# Patient Record
Sex: FEMALE | Race: WHITE | NOT HISPANIC OR LATINO | Employment: FULL TIME | URBAN - NONMETROPOLITAN AREA
[De-identification: names, ages, dates, MRNs, and addresses within clinical notes are randomized per-mention and may not be internally consistent; named-entity substitution may affect disease eponyms.]

---

## 2021-12-30 LAB
EXTERNAL ABO GROUPING: NORMAL
EXTERNAL ANTIBODY SCREEN: NEGATIVE
EXTERNAL HEPATITIS B SURFACE ANTIGEN: NEGATIVE
EXTERNAL RH FACTOR: POSITIVE
EXTERNAL RUBELLA QUALITATIVE: NORMAL
EXTERNAL SYPHILIS RPR SCREEN: NORMAL
HIV1 P24 AG SERPL QL IA: NORMAL

## 2022-03-22 ENCOUNTER — TRANSCRIBE ORDERS (OUTPATIENT)
Dept: ADMINISTRATIVE | Facility: HOSPITAL | Age: 32
End: 2022-03-22

## 2022-03-22 DIAGNOSIS — O12.02 GESTATIONAL EDEMA IN SECOND TRIMESTER: Primary | ICD-10-CM

## 2022-03-28 ENCOUNTER — APPOINTMENT (OUTPATIENT)
Dept: ULTRASOUND IMAGING | Facility: HOSPITAL | Age: 32
End: 2022-03-28

## 2022-03-28 ENCOUNTER — HOSPITAL ENCOUNTER (OUTPATIENT)
Dept: CARDIOLOGY | Facility: HOSPITAL | Age: 32
Discharge: HOME | End: 2022-03-28
Admitting: OBSTETRICS & GYNECOLOGY

## 2022-03-28 DIAGNOSIS — O12.02 GESTATIONAL EDEMA IN SECOND TRIMESTER: ICD-10-CM

## 2022-03-28 PROCEDURE — 93970 EXTREMITY STUDY: CPT | Performed by: RADIOLOGY

## 2022-03-28 PROCEDURE — 93970 EXTREMITY STUDY: CPT

## 2022-06-24 LAB — EXTERNAL GROUP B STREP ANTIGEN: NEGATIVE

## 2022-07-15 ENCOUNTER — ANESTHESIA (OUTPATIENT)
Dept: LABOR AND DELIVERY | Facility: HOSPITAL | Age: 32
End: 2022-07-15

## 2022-07-15 ENCOUNTER — ANESTHESIA EVENT (OUTPATIENT)
Dept: LABOR AND DELIVERY | Facility: HOSPITAL | Age: 32
End: 2022-07-15

## 2022-07-15 ENCOUNTER — HOSPITAL ENCOUNTER (INPATIENT)
Facility: HOSPITAL | Age: 32
LOS: 2 days | Discharge: HOME | End: 2022-07-17
Attending: OBSTETRICS & GYNECOLOGY | Admitting: OBSTETRICS & GYNECOLOGY

## 2022-07-15 ENCOUNTER — HOSPITAL ENCOUNTER (INPATIENT)
Dept: LABOR AND DELIVERY | Facility: HOSPITAL | Age: 32
Discharge: HOME | End: 2022-07-15

## 2022-07-15 PROBLEM — Z34.90 PREGNANCY: Status: ACTIVE | Noted: 2022-07-15

## 2022-07-15 LAB
ABO GROUP BLD: NORMAL
ABO GROUP BLD: NORMAL
AMPHET+METHAMPHET UR QL: NEGATIVE
AMPHETAMINES UR QL: NEGATIVE
ATMOSPHERIC PRESS: 730 MMHG
BARBITURATES UR QL SCN: NEGATIVE
BASE EXCESS BLDCOV CALC-SCNC: -8.3 MMOL/L (ref 0–2)
BASOPHILS # BLD AUTO: 0.02 10*3/MM3 (ref 0–0.2)
BASOPHILS NFR BLD AUTO: 0.2 % (ref 0–1.5)
BDY SITE: ABNORMAL
BENZODIAZ UR QL SCN: NEGATIVE
BLD GP AB SCN SERPL QL: NEGATIVE
BODY TEMPERATURE: 37 C
BUPRENORPHINE SERPL-MCNC: NEGATIVE NG/ML
CANNABINOIDS SERPL QL: NEGATIVE
CO2 BLDA-SCNC: 21.2 MMOL/L (ref 22–33)
COCAINE UR QL: NEGATIVE
COLLECT TME SMN: ABNORMAL
DEPRECATED RDW RBC AUTO: 47.9 FL (ref 37–54)
EOSINOPHIL # BLD AUTO: 0.08 10*3/MM3 (ref 0–0.4)
EOSINOPHIL NFR BLD AUTO: 0.7 % (ref 0.3–6.2)
ERYTHROCYTE [DISTWIDTH] IN BLOOD BY AUTOMATED COUNT: 14.2 % (ref 12.3–15.4)
HCO3 BLDCOV-SCNC: 19.7 MMOL/L (ref 18.6–21.4)
HCT VFR BLD AUTO: 31.2 % (ref 34–46.6)
HGB BLD-MCNC: 10.3 G/DL (ref 12–15.9)
HGB BLDA-MCNC: 16.4 G/DL (ref 13.5–17.5)
IMM GRANULOCYTES # BLD AUTO: 0.23 10*3/MM3 (ref 0–0.05)
IMM GRANULOCYTES NFR BLD AUTO: 1.9 % (ref 0–0.5)
INHALED O2 CONCENTRATION: 21 %
LYMPHOCYTES # BLD AUTO: 2.59 10*3/MM3 (ref 0.7–3.1)
LYMPHOCYTES NFR BLD AUTO: 21.2 % (ref 19.6–45.3)
Lab: ABNORMAL
MCH RBC QN AUTO: 31.1 PG (ref 26.6–33)
MCHC RBC AUTO-ENTMCNC: 33 G/DL (ref 31.5–35.7)
MCV RBC AUTO: 94.3 FL (ref 79–97)
METHADONE UR QL SCN: NEGATIVE
MODALITY: ABNORMAL
MONOCYTES # BLD AUTO: 0.89 10*3/MM3 (ref 0.1–0.9)
MONOCYTES NFR BLD AUTO: 7.3 % (ref 5–12)
NEUTROPHILS NFR BLD AUTO: 68.7 % (ref 42.7–76)
NEUTROPHILS NFR BLD AUTO: 8.43 10*3/MM3 (ref 1.7–7)
NOTE: ABNORMAL
NRBC BLD AUTO-RTO: 0 /100 WBC (ref 0–0.2)
OPIATES UR QL: NEGATIVE
OXYCODONE UR QL SCN: NEGATIVE
PCO2 BLDCOV: 48.6 MM HG (ref 30–60)
PCP UR QL SCN: NEGATIVE
PH BLDCOV: 7.22 PH UNITS (ref 7.19–7.46)
PLATELET # BLD AUTO: 252 10*3/MM3 (ref 140–450)
PMV BLD AUTO: 10.6 FL (ref 6–12)
PO2 BLDCOV: 20.9 MM HG (ref 16–43)
PROPOXYPH UR QL: NEGATIVE
RBC # BLD AUTO: 3.31 10*6/MM3 (ref 3.77–5.28)
RH BLD: POSITIVE
RH BLD: POSITIVE
SAO2 % BLDCOA: ABNORMAL %
SAO2 % BLDCOV: 39 % (ref 45–75)
SARS-COV-2 RNA PNL SPEC NAA+PROBE: NOT DETECTED
T&S EXPIRATION DATE: NORMAL
TRICYCLICS UR QL SCN: NEGATIVE
WBC NRBC COR # BLD: 12.24 10*3/MM3 (ref 3.4–10.8)

## 2022-07-15 PROCEDURE — 0KQM0ZZ REPAIR PERINEUM MUSCLE, OPEN APPROACH: ICD-10-PCS | Performed by: OBSTETRICS & GYNECOLOGY

## 2022-07-15 PROCEDURE — 82805 BLOOD GASES W/O2 SATURATION: CPT

## 2022-07-15 PROCEDURE — 3E033VJ INTRODUCTION OF OTHER HORMONE INTO PERIPHERAL VEIN, PERCUTANEOUS APPROACH: ICD-10-PCS | Performed by: OBSTETRICS & GYNECOLOGY

## 2022-07-15 PROCEDURE — 25010000002 FENTANYL CITRATE (PF) 50 MCG/ML SOLUTION: Performed by: ANESTHESIOLOGY

## 2022-07-15 PROCEDURE — 86850 RBC ANTIBODY SCREEN: CPT | Performed by: OBSTETRICS & GYNECOLOGY

## 2022-07-15 PROCEDURE — 25010000002 ROPIVACAINE PER 1 MG: Performed by: ANESTHESIOLOGY

## 2022-07-15 PROCEDURE — 86900 BLOOD TYPING SEROLOGIC ABO: CPT

## 2022-07-15 PROCEDURE — C1755 CATHETER, INTRASPINAL: HCPCS

## 2022-07-15 PROCEDURE — 86901 BLOOD TYPING SEROLOGIC RH(D): CPT

## 2022-07-15 PROCEDURE — 59025 FETAL NON-STRESS TEST: CPT

## 2022-07-15 PROCEDURE — 80306 DRUG TEST PRSMV INSTRMNT: CPT | Performed by: OBSTETRICS & GYNECOLOGY

## 2022-07-15 PROCEDURE — 87635 SARS-COV-2 COVID-19 AMP PRB: CPT | Performed by: OBSTETRICS & GYNECOLOGY

## 2022-07-15 PROCEDURE — 85025 COMPLETE CBC W/AUTO DIFF WBC: CPT | Performed by: OBSTETRICS & GYNECOLOGY

## 2022-07-15 PROCEDURE — 86901 BLOOD TYPING SEROLOGIC RH(D): CPT | Performed by: OBSTETRICS & GYNECOLOGY

## 2022-07-15 PROCEDURE — 86900 BLOOD TYPING SEROLOGIC ABO: CPT | Performed by: OBSTETRICS & GYNECOLOGY

## 2022-07-15 RX ORDER — IBUPROFEN 800 MG/1
800 TABLET ORAL EVERY 8 HOURS SCHEDULED
Status: DISCONTINUED | OUTPATIENT
Start: 2022-07-15 | End: 2022-07-15 | Stop reason: SDUPTHER

## 2022-07-15 RX ORDER — TERBUTALINE SULFATE 1 MG/ML
0.2 INJECTION, SOLUTION SUBCUTANEOUS AS NEEDED
Status: DISCONTINUED | OUTPATIENT
Start: 2022-07-15 | End: 2022-07-15 | Stop reason: HOSPADM

## 2022-07-15 RX ORDER — PRENATAL VIT/IRON FUM/FOLIC AC 27MG-0.8MG
1 TABLET ORAL DAILY
Status: CANCELLED | OUTPATIENT
Start: 2022-07-16

## 2022-07-15 RX ORDER — HYDROCORTISONE ACETATE PRAMOXINE HCL 1; 1 G/100G; G/100G
1 CREAM TOPICAL AS NEEDED
Status: DISCONTINUED | OUTPATIENT
Start: 2022-07-15 | End: 2022-07-17 | Stop reason: HOSPADM

## 2022-07-15 RX ORDER — GLYCERIN/PROPYLENE GLYCOL/WATR
1 SOLUTION, NON-ORAL VAGINAL AS NEEDED
Status: DISCONTINUED | OUTPATIENT
Start: 2022-07-15 | End: 2022-07-15

## 2022-07-15 RX ORDER — ROPIVACAINE HYDROCHLORIDE 2 MG/ML
14 INJECTION, SOLUTION EPIDURAL; INFILTRATION; PERINEURAL CONTINUOUS
Status: DISCONTINUED | OUTPATIENT
Start: 2022-07-15 | End: 2022-07-15

## 2022-07-15 RX ORDER — ACETAMINOPHEN 325 MG/1
650 TABLET ORAL EVERY 4 HOURS PRN
Status: DISCONTINUED | OUTPATIENT
Start: 2022-07-15 | End: 2022-07-15 | Stop reason: HOSPADM

## 2022-07-15 RX ORDER — ROPIVACAINE HYDROCHLORIDE 5 MG/ML
INJECTION, SOLUTION EPIDURAL; INFILTRATION; PERINEURAL AS NEEDED
Status: SHIPPED | OUTPATIENT
Start: 2022-07-15

## 2022-07-15 RX ORDER — ONDANSETRON 2 MG/ML
4 INJECTION INTRAMUSCULAR; INTRAVENOUS EVERY 6 HOURS PRN
Status: DISCONTINUED | OUTPATIENT
Start: 2022-07-15 | End: 2022-07-17 | Stop reason: HOSPADM

## 2022-07-15 RX ORDER — ONDANSETRON 2 MG/ML
4 INJECTION INTRAMUSCULAR; INTRAVENOUS ONCE AS NEEDED
Status: DISCONTINUED | OUTPATIENT
Start: 2022-07-15 | End: 2022-07-15 | Stop reason: HOSPADM

## 2022-07-15 RX ORDER — MISOPROSTOL 100 UG/1
1000 TABLET ORAL ONCE AS NEEDED
Status: DISCONTINUED | OUTPATIENT
Start: 2022-07-15 | End: 2022-07-17 | Stop reason: HOSPADM

## 2022-07-15 RX ORDER — SODIUM CHLORIDE 0.9 % (FLUSH) 0.9 %
1-10 SYRINGE (ML) INJECTION AS NEEDED
Status: DISCONTINUED | OUTPATIENT
Start: 2022-07-15 | End: 2022-07-17 | Stop reason: HOSPADM

## 2022-07-15 RX ORDER — MISOPROSTOL 100 UG/1
600 TABLET ORAL AS NEEDED
Status: DISCONTINUED | OUTPATIENT
Start: 2022-07-15 | End: 2022-07-15 | Stop reason: HOSPADM

## 2022-07-15 RX ORDER — PRENATAL VIT/IRON FUM/FOLIC AC 27MG-0.8MG
1 TABLET ORAL DAILY
COMMUNITY

## 2022-07-15 RX ORDER — HYDROCODONE BITARTRATE AND ACETAMINOPHEN 5; 325 MG/1; MG/1
1 TABLET ORAL EVERY 4 HOURS PRN
Status: DISCONTINUED | OUTPATIENT
Start: 2022-07-15 | End: 2022-07-15 | Stop reason: HOSPADM

## 2022-07-15 RX ORDER — DIPHENHYDRAMINE HCL 25 MG
25 CAPSULE ORAL NIGHTLY PRN
Status: DISCONTINUED | OUTPATIENT
Start: 2022-07-15 | End: 2022-07-17 | Stop reason: HOSPADM

## 2022-07-15 RX ORDER — HYDROCORTISONE 25 MG/G
1 CREAM TOPICAL AS NEEDED
Status: DISCONTINUED | OUTPATIENT
Start: 2022-07-15 | End: 2022-07-17 | Stop reason: HOSPADM

## 2022-07-15 RX ORDER — OXYTOCIN/0.9 % SODIUM CHLORIDE 30/500 ML
250 PLASTIC BAG, INJECTION (ML) INTRAVENOUS CONTINUOUS
Status: ACTIVE | OUTPATIENT
Start: 2022-07-15 | End: 2022-07-15

## 2022-07-15 RX ORDER — ROPIVACAINE HYDROCHLORIDE 5 MG/ML
INJECTION, SOLUTION EPIDURAL; INFILTRATION; PERINEURAL
Status: COMPLETED
Start: 2022-07-15 | End: 2022-07-15

## 2022-07-15 RX ORDER — PRENATAL VIT/IRON FUM/FOLIC AC 27MG-0.8MG
1 TABLET ORAL DAILY
Status: CANCELLED | OUTPATIENT
Start: 2022-07-15

## 2022-07-15 RX ORDER — BISACODYL 10 MG
10 SUPPOSITORY, RECTAL RECTAL DAILY PRN
Status: DISCONTINUED | OUTPATIENT
Start: 2022-07-16 | End: 2022-07-17 | Stop reason: HOSPADM

## 2022-07-15 RX ORDER — IBUPROFEN 800 MG/1
800 TABLET ORAL EVERY 8 HOURS SCHEDULED
Status: DISCONTINUED | OUTPATIENT
Start: 2022-07-15 | End: 2022-07-17 | Stop reason: HOSPADM

## 2022-07-15 RX ORDER — CARBOPROST TROMETHAMINE 250 UG/ML
250 INJECTION, SOLUTION INTRAMUSCULAR AS NEEDED
Status: DISCONTINUED | OUTPATIENT
Start: 2022-07-15 | End: 2022-07-15 | Stop reason: HOSPADM

## 2022-07-15 RX ORDER — SODIUM CHLORIDE 0.9 % (FLUSH) 0.9 %
3-10 SYRINGE (ML) INJECTION AS NEEDED
Status: DISCONTINUED | OUTPATIENT
Start: 2022-07-15 | End: 2022-07-15 | Stop reason: HOSPADM

## 2022-07-15 RX ORDER — FENTANYL CITRATE 50 UG/ML
INJECTION, SOLUTION INTRAMUSCULAR; INTRAVENOUS AS NEEDED
Status: SHIPPED | OUTPATIENT
Start: 2022-07-15

## 2022-07-15 RX ORDER — ONDANSETRON 4 MG/1
4 TABLET, FILM COATED ORAL EVERY 6 HOURS PRN
Status: DISCONTINUED | OUTPATIENT
Start: 2022-07-15 | End: 2022-07-15 | Stop reason: HOSPADM

## 2022-07-15 RX ORDER — FENTANYL CITRATE 50 UG/ML
INJECTION, SOLUTION INTRAMUSCULAR; INTRAVENOUS
Status: COMPLETED
Start: 2022-07-15 | End: 2022-07-15

## 2022-07-15 RX ORDER — OXYTOCIN/0.9 % SODIUM CHLORIDE 30/500 ML
125 PLASTIC BAG, INJECTION (ML) INTRAVENOUS CONTINUOUS PRN
Status: DISCONTINUED | OUTPATIENT
Start: 2022-07-15 | End: 2022-07-17 | Stop reason: HOSPADM

## 2022-07-15 RX ORDER — OXYTOCIN/0.9 % SODIUM CHLORIDE 30/500 ML
2-20 PLASTIC BAG, INJECTION (ML) INTRAVENOUS
Status: DISCONTINUED | OUTPATIENT
Start: 2022-07-15 | End: 2022-07-15 | Stop reason: HOSPADM

## 2022-07-15 RX ORDER — FAMOTIDINE 10 MG/ML
20 INJECTION, SOLUTION INTRAVENOUS ONCE AS NEEDED
Status: DISCONTINUED | OUTPATIENT
Start: 2022-07-15 | End: 2022-07-15 | Stop reason: HOSPADM

## 2022-07-15 RX ORDER — METHYLERGONOVINE MALEATE 0.2 MG/ML
200 INJECTION INTRAVENOUS ONCE AS NEEDED
Status: DISCONTINUED | OUTPATIENT
Start: 2022-07-15 | End: 2022-07-15 | Stop reason: HOSPADM

## 2022-07-15 RX ORDER — PRENATAL VIT/IRON FUM/FOLIC AC 27MG-0.8MG
1 TABLET ORAL DAILY
Status: DISCONTINUED | OUTPATIENT
Start: 2022-07-16 | End: 2022-07-17 | Stop reason: HOSPADM

## 2022-07-15 RX ORDER — DOCUSATE SODIUM 100 MG/1
100 CAPSULE, LIQUID FILLED ORAL 2 TIMES DAILY
Status: DISCONTINUED | OUTPATIENT
Start: 2022-07-15 | End: 2022-07-17 | Stop reason: HOSPADM

## 2022-07-15 RX ORDER — CARBOPROST TROMETHAMINE 250 UG/ML
250 INJECTION, SOLUTION INTRAMUSCULAR ONCE AS NEEDED
Status: DISCONTINUED | OUTPATIENT
Start: 2022-07-15 | End: 2022-07-17 | Stop reason: HOSPADM

## 2022-07-15 RX ORDER — MAGNESIUM HYDROXIDE 1200 MG/15ML
1000 LIQUID ORAL ONCE AS NEEDED
Status: DISCONTINUED | OUTPATIENT
Start: 2022-07-15 | End: 2022-07-15 | Stop reason: HOSPADM

## 2022-07-15 RX ORDER — FENTANYL CIT 0.2 MG/100ML-ROPIV 0.2%-NACL 0.9% EPIDURAL INJ 2/0.2 MCG/ML-%
8 SOLUTION INJECTION CONTINUOUS
Status: DISCONTINUED | OUTPATIENT
Start: 2022-07-15 | End: 2022-07-15

## 2022-07-15 RX ORDER — EPHEDRINE SULFATE 5 MG/ML
10 INJECTION INTRAVENOUS
Status: DISCONTINUED | OUTPATIENT
Start: 2022-07-15 | End: 2022-07-15 | Stop reason: HOSPADM

## 2022-07-15 RX ORDER — ROPIVACAINE HYDROCHLORIDE 2 MG/ML
INJECTION, SOLUTION EPIDURAL; INFILTRATION; PERINEURAL
Status: COMPLETED
Start: 2022-07-15 | End: 2022-07-15

## 2022-07-15 RX ORDER — BUTORPHANOL TARTRATE 1 MG/ML
1 INJECTION, SOLUTION INTRAMUSCULAR; INTRAVENOUS
Status: DISCONTINUED | OUTPATIENT
Start: 2022-07-15 | End: 2022-07-15 | Stop reason: HOSPADM

## 2022-07-15 RX ORDER — SODIUM CHLORIDE, SODIUM LACTATE, POTASSIUM CHLORIDE, CALCIUM CHLORIDE 600; 310; 30; 20 MG/100ML; MG/100ML; MG/100ML; MG/100ML
125 INJECTION, SOLUTION INTRAVENOUS CONTINUOUS
Status: DISCONTINUED | OUTPATIENT
Start: 2022-07-15 | End: 2022-07-15

## 2022-07-15 RX ORDER — ONDANSETRON 2 MG/ML
4 INJECTION INTRAMUSCULAR; INTRAVENOUS EVERY 6 HOURS PRN
Status: DISCONTINUED | OUTPATIENT
Start: 2022-07-15 | End: 2022-07-15 | Stop reason: HOSPADM

## 2022-07-15 RX ORDER — OXYTOCIN/0.9 % SODIUM CHLORIDE 30/500 ML
999 PLASTIC BAG, INJECTION (ML) INTRAVENOUS ONCE
Status: DISCONTINUED | OUTPATIENT
Start: 2022-07-15 | End: 2022-07-15 | Stop reason: HOSPADM

## 2022-07-15 RX ORDER — MINERAL OIL
OIL (ML) MISCELLANEOUS ONCE
Status: DISCONTINUED | OUTPATIENT
Start: 2022-07-15 | End: 2022-07-15 | Stop reason: HOSPADM

## 2022-07-15 RX ORDER — LIDOCAINE HYDROCHLORIDE AND EPINEPHRINE 15; 5 MG/ML; UG/ML
INJECTION, SOLUTION EPIDURAL AS NEEDED
Status: SHIPPED | OUTPATIENT
Start: 2022-07-15

## 2022-07-15 RX ORDER — ONDANSETRON 4 MG/1
4 TABLET, FILM COATED ORAL EVERY 6 HOURS PRN
Status: DISCONTINUED | OUTPATIENT
Start: 2022-07-15 | End: 2022-07-17 | Stop reason: HOSPADM

## 2022-07-15 RX ORDER — METHYLERGONOVINE MALEATE 0.2 MG/ML
200 INJECTION INTRAVENOUS ONCE AS NEEDED
Status: DISCONTINUED | OUTPATIENT
Start: 2022-07-15 | End: 2022-07-17 | Stop reason: HOSPADM

## 2022-07-15 RX ADMIN — BENZOCAINE 1 APPLICATION: 5.6 OINTMENT TOPICAL at 21:21

## 2022-07-15 RX ADMIN — LIDOCAINE HYDROCHLORIDE AND EPINEPHRINE 3 ML: 15; 5 INJECTION, SOLUTION EPIDURAL at 13:57

## 2022-07-15 RX ADMIN — WITCH HAZEL 1 PAD: 500 SOLUTION RECTAL; TOPICAL at 21:21

## 2022-07-15 RX ADMIN — SODIUM CHLORIDE, POTASSIUM CHLORIDE, SODIUM LACTATE AND CALCIUM CHLORIDE 125 ML/HR: 600; 310; 30; 20 INJECTION, SOLUTION INTRAVENOUS at 07:52

## 2022-07-15 RX ADMIN — ROPIVACAINE HYDROCHLORIDE 14 ML/HR: 2 INJECTION, SOLUTION EPIDURAL; INFILTRATION at 14:02

## 2022-07-15 RX ADMIN — FENTANYL CITRATE 100 MCG: 50 INJECTION, SOLUTION INTRAMUSCULAR; INTRAVENOUS at 14:00

## 2022-07-15 RX ADMIN — SODIUM CHLORIDE, POTASSIUM CHLORIDE, SODIUM LACTATE AND CALCIUM CHLORIDE 1000 ML: 600; 310; 30; 20 INJECTION, SOLUTION INTRAVENOUS at 13:03

## 2022-07-15 RX ADMIN — HYDROCORTISONE 2.5% 1 APPLICATION: 25 CREAM TOPICAL at 21:21

## 2022-07-15 RX ADMIN — DOCUSATE SODIUM 100 MG: 100 CAPSULE ORAL at 21:11

## 2022-07-15 RX ADMIN — IBUPROFEN 800 MG: 800 TABLET, FILM COATED ORAL at 21:11

## 2022-07-15 RX ADMIN — Medication 2 MILLI-UNITS/MIN: at 08:35

## 2022-07-15 RX ADMIN — ROPIVACAINE HYDROCHLORIDE 7 ML: 5 INJECTION, SOLUTION EPIDURAL; INFILTRATION; PERINEURAL at 14:00

## 2022-07-15 RX ADMIN — Medication 1 APPLICATION: at 21:21

## 2022-07-15 RX ADMIN — EPHEDRINE SULFATE 10 MG: 5 INJECTION INTRAVENOUS at 14:20

## 2022-07-15 RX ADMIN — Medication: at 21:21

## 2022-07-15 NOTE — ANESTHESIA PROCEDURE NOTES
Labor Epidural      Patient reassessed immediately prior to procedure    Patient location during procedure: OB  Start Time: 7/15/2022 1:48 PM  Stop Time: 7/15/2022 2:05 PM  Indication:at surgeon's request  Performed By  Anesthesiologist: Rui Serrato MD  Preanesthetic Checklist  Completed: patient identified, IV checked, site marked, risks and benefits discussed, surgical consent, monitors and equipment checked, pre-op evaluation and timeout performed  Prep:  Pt Position:sitting  Sterile Tech:gloves, cap and sterile barrier  Prep:povidone-iodine 7.5% surgical scrub  Monitoring:continuous pulse oximetry, EKG and blood pressure monitoring  Epidural Block Procedure:  Approach:midline  Guidance:landmark technique  Location:L2-L3  Needle Type:Tuohy  Needle Gauge:18 G  Loss of Resistance Medium: saline  Loss of Resistance: 7cm  Cath Depth at skin:15 cm  Paresthesia: none  Aspiration:negative  Test Dose:negative  Post Assessment:  Dressing:secured with tape and occlusive dressing applied  Pt Tolerance:patient tolerated the procedure well with no apparent complications  Complications:no

## 2022-07-15 NOTE — ANESTHESIA PREPROCEDURE EVALUATION
Anesthesia Evaluation     Patient summary reviewed and Nursing notes reviewed   no history of anesthetic complications:  NPO Solid Status: > 8 hours  NPO Liquid Status: > 8 hours           Airway   Mallampati: II  TM distance: >3 FB  Neck ROM: full  No difficulty expected  Dental - normal exam     Pulmonary - negative pulmonary ROS and normal exam    breath sounds clear to auscultation  Cardiovascular - negative cardio ROS and normal exam    Rhythm: regular  Rate: normal        Neuro/Psych- negative ROS  GI/Hepatic/Renal/Endo - negative ROS     Musculoskeletal (-) negative ROS    Abdominal  - normal exam   Substance History - negative use     OB/GYN    (+) Pregnant,         Other - negative ROS                       Anesthesia Plan    ASA 2     epidural       Anesthetic plan, risks, benefits, and alternatives have been provided, discussed and informed consent has been obtained with: patient.  Use of blood products discussed with patient  Consented to blood products.       CODE STATUS:    Level Of Support Discussed With: Patient  Code Status (Patient has no pulse and is not breathing): CPR (Attempt to Resuscitate)  Medical Interventions (Patient has pulse or is breathing): Full

## 2022-07-15 NOTE — L&D DELIVERY NOTE
JESUS Ramos  Vaginal Delivery Note   Review the Delivery Report for details.       Delivery     Delivery: Vaginal, Spontaneous     YOB: 2021    Time of Birth:  Gestational Age 4:38 PM   39w0d     Anesthesia:      Delivering clinician: Leah Quezada    Forceps?   No   Vacuum? No    Shoulder dystocia present: No        Delivery narrative:  Patient is  39w0d admitted for elective IOL. Pitocin was used for induction. She progressed to 10cm dilation and pushed to deliver live male infant from JORDYN position. Apgars 8/9. 1x Nuchal cord was noted and reduced at perineum. Following delivery of infant, infant was placed on mother's chest in kangaroo care. Mouth and nares were suctioned and cord was doubly clamped and cut. Cord blood was collected. Placenta delivered with gentle traction on umbilical cord. IV pitocin was started. Right labial laceration and 2nd degree perineal laceration were noted and repaired with 4-0 vicryl and 4-0 vicryl. Uterus was firm to palpation at completion of procedure.     Infant    Findings: male  infant     Infant observations: Weight: No birth weight on file.   Length:   in  Observations/Comments:  HR-150, R-50, T-97.9AX      Apgars: 8  @ 1 minute /    9  @ 5 minutes         Placenta, Cord, and Fluid    Placenta delivered  Spontaneous  at   7/15/2022  7:40 PM     Cord: 3 vessels  present.   Nuchal Cord?  yes; Number of nuchal loops present:  1    Cord blood obtained: Yes    Cord gases obtained:  Yes    Cord gas results: Venous:    pH, Cord Venous   Date Value Ref Range Status   07/15/2022 7.217 7.190 - 7.460 pH Units Final       Arterial:  No results found for: PHCART     Repair    Episiotomy: None     No    Lacerations: Yes  Laceration Information  Laceration Repaired?   Perineal: 2nd  Yes    Periurethral:       Labial: right  Yes    Sulcus:       Vaginal:       Cervical:         Suture used for repair: 2-0 and 4-0 Vicryl   Estimated Blood Loss:  200ml              Quantitative Blood Loss:                  Complications  none    Disposition  Mother to Mother Baby/Postpartum  in stable condition currently.  Baby to remains with mom  in stable condition currently.      Leah Quezada MD  07/15/22  17:04 EDT

## 2022-07-15 NOTE — NON STRESS TEST
Hoa Wrenbelle Waldron, a  at 39w0d with an CODY of 2022, by Last Menstrual Period, was seen at [facility] LABOR DELIVERY for a nonstress test.    Chief Complaint   Patient presents with   • Scheduled Induction       Patient Active Problem List   Diagnosis   • Pregnancy       Start Time: 700  Stop Time: 720    Interpretation A  Nonstress Test Interpretation A: Reactive  Comments A: VERIFIED BY ROMAINE WELCH RN

## 2022-07-15 NOTE — H&P
JESUS Ramos  Obstetric History and Physical    Chief Complaint   Patient presents with   • Scheduled Induction       Subjective     Patient is a 32 y.o. female  currently at 39w0d, who presents for scheduled IOL.    Her pregnancy is c/b:  - hx oligohydramnios in prior pregnancy    The following portions of the patients history were reviewed and updated as appropriate: past medical history and past surgical history .     She denies history of asthma, HTN, HSV.      Prenatal Information:   Maternal Prenatal Labs  Blood Type ABO Type   Date Value Ref Range Status   07/15/2022 A  Final      Rh Status RH type   Date Value Ref Range Status   07/15/2022 Positive  Final      Antibody Screen Antibody Screen   Date Value Ref Range Status   07/15/2022 Negative  Final      Rapid Urin Drug Screen Barbiturates Screen, Urine   Date Value Ref Range Status   07/15/2022 Negative Negative Final     Benzodiazepine Screen, Urine   Date Value Ref Range Status   07/15/2022 Negative Negative Final     Methadone Screen, Urine   Date Value Ref Range Status   07/15/2022 Negative Negative Final     Opiate Screen   Date Value Ref Range Status   07/15/2022 Negative Negative Final     THC, Screen, Urine   Date Value Ref Range Status   07/15/2022 Negative Negative Final     Cocaine Screen, Urine   Date Value Ref Range Status   07/15/2022 Negative Negative Final     Amphetamine Screen, Urine   Date Value Ref Range Status   07/15/2022 Negative Negative Final     Propoxyphene Screen   Date Value Ref Range Status   07/15/2022 Negative Negative Final     Buprenorphine, Screen, Urine   Date Value Ref Range Status   07/15/2022 Negative Negative Final     Methamphetamine, Ur   Date Value Ref Range Status   07/15/2022 Negative Negative Final     Oxycodone Screen, Urine   Date Value Ref Range Status   07/15/2022 Negative Negative Final     Tricyclic Antidepressants Screen   Date Value Ref Range Status   07/15/2022 Negative Negative Final      Group B  Strep Culture No results found for: GBSANTIGEN           External Prenatal Results     Pregnancy Outside Results - Transcribed From Office Records - See Scanned Records For Details     Test Value Date Time    ABO  A  07/15/22 0644    Rh  Positive  07/15/22 0644    Antibody Screen  Negative  07/15/22 0644      ^ Negative  21     Varicella IgG       Rubella ^ Immune  21     Hgb  10.3 g/dL 07/15/22 0644    Hct  31.2 % 07/15/22 0644    Glucose Fasting GTT       Glucose Tolerance Test 1 hour       Glucose Tolerance Test 3 hour       Gonorrhea (discrete)       Chlamydia (discrete)       RPR ^ Non-Reactive  21     VDRL       Syphilis Antibody       HBsAg ^ Negative  21     Herpes Simplex Virus PCR       Herpes Simplex VIrus Culture       HIV ^ Non-Reactive  21     Hep C RNA Quant PCR       Hep C Antibody       AFP       Group B Strep ^ Negative  22     GBS Susceptibility to Clindamycin       GBS Susceptibility to Erythromycin       Fetal Fibronectin       Genetic Testing, Maternal Blood             Drug Screening     Test Value Date Time    Urine Drug Screen       Amphetamine Screen  Negative  07/15/22 0628    Barbiturate Screen  Negative  07/15/22 0628    Benzodiazepine Screen  Negative  07/15/22 0628    Methadone Screen  Negative  07/15/22 0628    Phencyclidine Screen  Negative  07/15/22 0628    Opiates Screen  Negative  07/15/22 0628    THC Screen  Negative  07/15/22 0628    Cocaine Screen       Propoxyphene Screen  Negative  07/15/22 0628    Buprenorphine Screen  Negative  07/15/22 0628    Methamphetamine Screen       Oxycodone Screen  Negative  07/15/22 0628    Tricyclic Antidepressants Screen  Negative  07/15/22 0628          Legend    ^: Historical                          Past OB History:     OB History    Para Term  AB Living   2 1 1 0 0 1   SAB IAB Ectopic Molar Multiple Live Births   0 0 0 0 0 1      # Outcome Date GA Lbr Paul/2nd Weight Sex Delivery Anes PTL Lv    2 Current            1 Term 10/2014 38w0d    Vag-Spont   SONU       Past Medical History: Past Medical History:   Diagnosis Date   • Allergies       Past Surgical History Past Surgical History:   Procedure Laterality Date   • TONSILLECTOMY        Family History: Family History   Problem Relation Age of Onset   • Hypertension Mother    • Hypertension Father    • Heart disease Father    • COPD Father       Social History:  reports that she has never smoked. She has never used smokeless tobacco.   reports no history of alcohol use.   reports no history of drug use.        Review of Systems  Complete ROS including constitutional, skin, HENT, Eyes, CV, Resp, GI, , MS, Endo/heme/allergies, Neuro, and Psych is negative unless otherwise indicated above or detailed in HPI      Objective     Vital Signs Range for the last 24 hours  Temperature: Temp:  [98.2 °F (36.8 °C)] 98.2 °F (36.8 °C)   Temp Source: Temp src: Oral   BP: BP: (126)/(80) 126/80   Pulse: Heart Rate:  [103] 103   Respirations: Resp:  [18] 18   Weight: Weight:  [96.6 kg (213 lb)] 96.6 kg (213 lb)     Physical Examination:    Gen: NAD   CV: RRR   Resp: CTAB, normal work of breathing on RA   Abd: soft, nontender, gravid   Extr: no edema bilaterally      Cervix: 2/50/-2  Presentation: Vertex by BSUS       Assessment/Plan:  Pt is 32 y.o.  39w0d admitted for planned IOL  1. Admit to L&D  2. IOL: pitocin, amniotomy performed with return of clear fluid   3. CEFM  4. GBS: neg - no ppx indicated    Leah Quezada MD  7/15/2022  09:09 EDT

## 2022-07-16 LAB
BASOPHILS # BLD AUTO: 0.03 10*3/MM3 (ref 0–0.2)
BASOPHILS NFR BLD AUTO: 0.2 % (ref 0–1.5)
DEPRECATED RDW RBC AUTO: 50 FL (ref 37–54)
EOSINOPHIL # BLD AUTO: 0.06 10*3/MM3 (ref 0–0.4)
EOSINOPHIL NFR BLD AUTO: 0.3 % (ref 0.3–6.2)
ERYTHROCYTE [DISTWIDTH] IN BLOOD BY AUTOMATED COUNT: 14.3 % (ref 12.3–15.4)
HCT VFR BLD AUTO: 27.4 % (ref 34–46.6)
HGB BLD-MCNC: 9 G/DL (ref 12–15.9)
IMM GRANULOCYTES # BLD AUTO: 0.22 10*3/MM3 (ref 0–0.05)
IMM GRANULOCYTES NFR BLD AUTO: 1.2 % (ref 0–0.5)
LYMPHOCYTES # BLD AUTO: 2.47 10*3/MM3 (ref 0.7–3.1)
LYMPHOCYTES NFR BLD AUTO: 14 % (ref 19.6–45.3)
MCH RBC QN AUTO: 31.5 PG (ref 26.6–33)
MCHC RBC AUTO-ENTMCNC: 32.8 G/DL (ref 31.5–35.7)
MCV RBC AUTO: 95.8 FL (ref 79–97)
MONOCYTES # BLD AUTO: 1.48 10*3/MM3 (ref 0.1–0.9)
MONOCYTES NFR BLD AUTO: 8.4 % (ref 5–12)
NEUTROPHILS NFR BLD AUTO: 13.39 10*3/MM3 (ref 1.7–7)
NEUTROPHILS NFR BLD AUTO: 75.9 % (ref 42.7–76)
NRBC BLD AUTO-RTO: 0 /100 WBC (ref 0–0.2)
PLATELET # BLD AUTO: 220 10*3/MM3 (ref 140–450)
PMV BLD AUTO: 11.1 FL (ref 6–12)
RBC # BLD AUTO: 2.86 10*6/MM3 (ref 3.77–5.28)
WBC NRBC COR # BLD: 17.65 10*3/MM3 (ref 3.4–10.8)

## 2022-07-16 PROCEDURE — 85025 COMPLETE CBC W/AUTO DIFF WBC: CPT | Performed by: OBSTETRICS & GYNECOLOGY

## 2022-07-16 RX ORDER — FERROUS SULFATE 325(65) MG
325 TABLET ORAL 2 TIMES DAILY WITH MEALS
Status: DISCONTINUED | OUTPATIENT
Start: 2022-07-16 | End: 2022-07-17 | Stop reason: HOSPADM

## 2022-07-16 RX ADMIN — PRENATAL VIT W/ FE FUMARATE-FA TAB 27-0.8 MG 1 TABLET: 27-0.8 TAB at 09:55

## 2022-07-16 RX ADMIN — IBUPROFEN 800 MG: 800 TABLET, FILM COATED ORAL at 21:20

## 2022-07-16 RX ADMIN — IBUPROFEN 800 MG: 800 TABLET, FILM COATED ORAL at 06:05

## 2022-07-16 RX ADMIN — IBUPROFEN 800 MG: 800 TABLET, FILM COATED ORAL at 15:12

## 2022-07-16 RX ADMIN — DOCUSATE SODIUM 100 MG: 100 CAPSULE ORAL at 21:20

## 2022-07-16 RX ADMIN — FERROUS SULFATE TAB 325 MG (65 MG ELEMENTAL FE) 325 MG: 325 (65 FE) TAB at 17:51

## 2022-07-16 RX ADMIN — DOCUSATE SODIUM 100 MG: 100 CAPSULE ORAL at 09:55

## 2022-07-16 NOTE — PROGRESS NOTES
" Richard  Vaginal Delivery Progress Note    Subjective   Postpartum Day 1: Vaginal Delivery    Patient feels well. Is ambulating and voiding without issue. Tolerating PO with no nausea/vomiting. Denies CP/SOB. Lochia WNL. Breast feeding, undecided regarding contraception, denies hx anxiety/depression.     Objective     Vital Signs Range for the last 24 hours  Temperature: Temp:  [97.6 °F (36.4 °C)-98.5 °F (36.9 °C)] 97.6 °F (36.4 °C)   Temp Source: Temp src: Oral   BP: BP: ()/(54-80) 96/64   Pulse: Heart Rate:  [] 86   Respirations: Resp:  [16-20] 16   SPO2: SpO2:  [93 %-100 %] 99 %   O2 Amount (l/min):     O2 Devices Device (Oxygen Therapy): room air   Weight:       Admit Height:  Height: 179.1 cm (70.5\")      Physical Exam:  General:  no acute distresss.  Cardiovascular: regular rate and rhythm  Respiratory: clear to auscultation bilaterally   Abdomen: abdomen is soft without significant tenderness, masses, organomegaly or guarding. Fundus: appropriate, firm, non tender  Extremities: normal, atraumatic, no cyanosis, and trace edema.     Lab results reviewed:  No       Assessment & Plan     Normal postpartum state      Plan:  Continue current care.   Hb 9.0: Fe ordered   Anticipate discharge on PPD#2      Leah Quezada MD  7/16/2022  13:04 EDT    "

## 2022-07-16 NOTE — PLAN OF CARE
Problem: Adult Inpatient Plan of Care  Goal: Plan of Care Review  Outcome: Ongoing, Progressing  Flowsheets (Taken 7/16/2022 0340 by Maricarmen Cervantes RN)  Progress: improving  Goal: Patient-Specific Goal (Individualized)  Outcome: Ongoing, Progressing  Goal: Absence of Hospital-Acquired Illness or Injury  Outcome: Ongoing, Progressing  Intervention: Identify and Manage Fall Risk  Recent Flowsheet Documentation  Taken 7/16/2022 0955 by Shilpa Olivera RN  Safety Promotion/Fall Prevention: safety round/check completed  Intervention: Prevent and Manage VTE (Venous Thromboembolism) Risk  Recent Flowsheet Documentation  Taken 7/16/2022 0955 by Shilpa Olivera RN  Activity Management: up ad tyrel  VTE Prevention/Management: (ambulating) other (see comments)  Intervention: Prevent Infection  Recent Flowsheet Documentation  Taken 7/16/2022 0955 by Shilpa Olivera RN  Infection Prevention:   visitors restricted/screened   single patient room provided   rest/sleep promoted   hand hygiene promoted  Goal: Optimal Comfort and Wellbeing  Outcome: Ongoing, Progressing  Intervention: Monitor Pain and Promote Comfort  Recent Flowsheet Documentation  Taken 7/16/2022 0955 by Shilpa Olivera RN  Pain Management Interventions: pain management plan reviewed with patient/caregiver  Intervention: Provide Person-Centered Care  Recent Flowsheet Documentation  Taken 7/16/2022 0955 by Shilpa Olivera RN  Trust Relationship/Rapport:   care explained   choices provided   emotional support provided   empathic listening provided   questions answered   questions encouraged   reassurance provided   thoughts/feelings acknowledged  Goal: Readiness for Transition of Care  Outcome: Ongoing, Progressing   Goal Outcome Evaluation:

## 2022-07-17 VITALS
SYSTOLIC BLOOD PRESSURE: 120 MMHG | RESPIRATION RATE: 18 BRPM | DIASTOLIC BLOOD PRESSURE: 70 MMHG | HEART RATE: 89 BPM | OXYGEN SATURATION: 98 % | TEMPERATURE: 97.8 F | HEIGHT: 71 IN | WEIGHT: 213 LBS | BODY MASS INDEX: 29.82 KG/M2

## 2022-07-17 RX ORDER — DOCUSATE SODIUM 100 MG/1
100 CAPSULE, LIQUID FILLED ORAL 2 TIMES DAILY PRN
Qty: 60 CAPSULE | Refills: 1 | Status: SHIPPED | OUTPATIENT
Start: 2022-07-17

## 2022-07-17 RX ORDER — IBUPROFEN 600 MG/1
600 TABLET ORAL EVERY 6 HOURS
Qty: 30 TABLET | Refills: 0 | Status: SHIPPED | OUTPATIENT
Start: 2022-07-17

## 2022-07-17 RX ORDER — FERROUS SULFATE 325(65) MG
325 TABLET ORAL 2 TIMES DAILY WITH MEALS
Qty: 60 TABLET | Refills: 0 | Status: SHIPPED | OUTPATIENT
Start: 2022-07-17

## 2022-07-17 RX ADMIN — DOCUSATE SODIUM 100 MG: 100 CAPSULE ORAL at 09:08

## 2022-07-17 RX ADMIN — IBUPROFEN 800 MG: 800 TABLET, FILM COATED ORAL at 15:56

## 2022-07-17 RX ADMIN — PRENATAL VIT W/ FE FUMARATE-FA TAB 27-0.8 MG 1 TABLET: 27-0.8 TAB at 09:08

## 2022-07-17 RX ADMIN — IBUPROFEN 800 MG: 800 TABLET, FILM COATED ORAL at 06:58

## 2022-07-17 RX ADMIN — FERROUS SULFATE TAB 325 MG (65 MG ELEMENTAL FE) 325 MG: 325 (65 FE) TAB at 09:08

## 2022-07-17 NOTE — PLAN OF CARE
Problem: Adult Inpatient Plan of Care  Goal: Plan of Care Review  Outcome: Adequate for Care Transition  Goal: Patient-Specific Goal (Individualized)  Outcome: Adequate for Care Transition  Goal: Absence of Hospital-Acquired Illness or Injury  Outcome: Adequate for Care Transition  Intervention: Identify and Manage Fall Risk  Recent Flowsheet Documentation  Taken 7/17/2022 0900 by Shilpa Olivera, RN  Safety Promotion/Fall Prevention: safety round/check completed  Intervention: Prevent and Manage VTE (Venous Thromboembolism) Risk  Recent Flowsheet Documentation  Taken 7/17/2022 0900 by Shilpa Olivera RN  Activity Management: up ad tyrel  VTE Prevention/Management: (ambulatory) other (see comments)  Goal: Optimal Comfort and Wellbeing  Outcome: Adequate for Care Transition  Intervention: Monitor Pain and Promote Comfort  Recent Flowsheet Documentation  Taken 7/17/2022 0900 by Shilpa Olivera RN  Pain Management Interventions: pain management plan reviewed with patient/caregiver  Intervention: Provide Person-Centered Care  Recent Flowsheet Documentation  Taken 7/17/2022 0900 by Shilpa Olivera RN  Trust Relationship/Rapport:   care explained   choices provided   emotional support provided   empathic listening provided   questions answered   questions encouraged   reassurance provided   thoughts/feelings acknowledged  Goal: Readiness for Transition of Care  Outcome: Adequate for Care Transition   Goal Outcome Evaluation:

## 2022-07-17 NOTE — DISCHARGE SUMMARY
Richard  Delivery Discharge Summary    Primary OB Clinician:     EDC: Estimated Date of Delivery: 22    Gestational Age:39w0d    Antepartum complications: none    Date of Delivery: 7/15/2022   Time of Delivery: 4:38 PM     Delivered By:  Leah Quezada     Delivery Type: Vaginal, Spontaneous      Tubal Ligation: n/a    Baby:   male    Apgar:  8  @ 1 minute /   Apgar:  9  @ 5 minutes   Weight: 3965 g (8 lb 11.9 oz)     Anesthesia: Epidural      Intrapartum complications: None    Rh Immune globulin given: not applicable    Subjective     Subjective   Postpartum day Day 2 S/P   Subjective  Patient reports:  Pain is controlled .  She is up out of bed this am. Tolerating diet. Tolerating po -- normal. Voiding - without difficulty; flatus reported.  Vaginal bleeding is as much as expected. Denies chest pain/shortness of breath. Denies history of anxiety/depression.    Breastfeeding: without difficulty.  Contraception:undecided    Objective    Objective  Vitals: Vital Signs Range for the last 24 hours  Temperature: Temp:  [97.8 °F (36.6 °C)-98.2 °F (36.8 °C)] 97.8 °F (36.6 °C)   Temp Source: Temp src: Oral   BP: BP: (102-120)/(70) 120/70   Pulse: Heart Rate:  [75-89] 89   Respirations: Resp:  [16-18] 18   Weight:          Physical Exam    Lungs clear to auscultation bilaterally   Abdomen Soft, ND, fundus firm to palpation below umbilicus    Respiratory Clear to ausculation bilaterally   CV Regular rate and rhythm    Extremities extremities normal, atraumatic, no cyanosis + edema equal bilaterally no erythema or warmth.       [unfilled]       Lab Results   Component Value Date    ABO A 07/15/2022    RH Positive 07/15/2022        Lab Results   Component Value Date    HGB 9.0 (L) 2022    HCT 27.4 (L) 2022       Assesment and Plan:       Pregnancy    Assessment & Plan    Assessment:    Hoa Waldron is Day 2  post-partum  Vaginal, Spontaneous   .      Plan:    Continue post op care and plan for  discharge today.     Follow-[facility] with [facility] [place] in 3 weeks.    Infection/bleeding precautions reviewed.     Discharge Date: 7/17/2022; Discharge Time: 15:34 EDT        Leah Quezada MD  7/17/2022  15:34 EDT

## 2022-07-17 NOTE — PLAN OF CARE
Goal Outcome Evaluation:  Plan of Care Reviewed With: patient        Progress: no change  Outcome Evaluation: fundus firm, midline

## 2024-04-24 LAB
EXTERNAL HEPATITIS B SURFACE ANTIGEN: NEGATIVE
EXTERNAL RUBELLA QUALITATIVE: NORMAL
EXTERNAL SYPHILIS RPR SCREEN: NORMAL
HIV1 P24 AG SERPL QL IA: NORMAL

## 2024-10-16 LAB — EXTERNAL GROUP B STREP ANTIGEN: NEGATIVE

## 2024-11-05 ENCOUNTER — HOSPITAL ENCOUNTER (OUTPATIENT)
Dept: LABOR AND DELIVERY | Facility: HOSPITAL | Age: 34
Discharge: HOME | End: 2024-11-05
Payer: COMMERCIAL

## 2024-11-05 ENCOUNTER — HOSPITAL ENCOUNTER (INPATIENT)
Facility: HOSPITAL | Age: 34
LOS: 3 days | Discharge: HOME | End: 2024-11-08
Attending: OBSTETRICS & GYNECOLOGY | Admitting: OBSTETRICS & GYNECOLOGY
Payer: COMMERCIAL

## 2024-11-05 LAB
ABO GROUP BLD: NORMAL
ALBUMIN SERPL-MCNC: 3.4 G/DL (ref 3.5–5.2)
ALBUMIN/GLOB SERPL: 1 G/DL
ALP SERPL-CCNC: 117 U/L (ref 39–117)
ALT SERPL W P-5'-P-CCNC: 8 U/L (ref 1–33)
AMPHET+METHAMPHET UR QL: NEGATIVE
AMPHETAMINES UR QL: NEGATIVE
ANION GAP SERPL CALCULATED.3IONS-SCNC: 15.3 MMOL/L (ref 5–15)
AST SERPL-CCNC: 18 U/L (ref 1–32)
BARBITURATES UR QL SCN: NEGATIVE
BASOPHILS # BLD AUTO: 0.01 10*3/MM3 (ref 0–0.2)
BASOPHILS NFR BLD AUTO: 0.1 % (ref 0–1.5)
BENZODIAZ UR QL SCN: NEGATIVE
BILIRUB SERPL-MCNC: 0.3 MG/DL (ref 0–1.2)
BLD GP AB SCN SERPL QL: NEGATIVE
BUN SERPL-MCNC: 10 MG/DL (ref 6–20)
BUN/CREAT SERPL: 13.2 (ref 7–25)
BUPRENORPHINE SERPL-MCNC: NEGATIVE NG/ML
CALCIUM SPEC-SCNC: 9.3 MG/DL (ref 8.6–10.5)
CANNABINOIDS SERPL QL: NEGATIVE
CHLORIDE SERPL-SCNC: 106 MMOL/L (ref 98–107)
CO2 SERPL-SCNC: 16.7 MMOL/L (ref 22–29)
COCAINE UR QL: NEGATIVE
CREAT SERPL-MCNC: 0.76 MG/DL (ref 0.57–1)
DEPRECATED RDW RBC AUTO: 48.6 FL (ref 37–54)
EGFRCR SERPLBLD CKD-EPI 2021: 106.3 ML/MIN/1.73
EOSINOPHIL # BLD AUTO: 0.04 10*3/MM3 (ref 0–0.4)
EOSINOPHIL NFR BLD AUTO: 0.4 % (ref 0.3–6.2)
ERYTHROCYTE [DISTWIDTH] IN BLOOD BY AUTOMATED COUNT: 14.6 % (ref 12.3–15.4)
FENTANYL UR-MCNC: NEGATIVE NG/ML
GLOBULIN UR ELPH-MCNC: 3.3 GM/DL
GLUCOSE SERPL-MCNC: 93 MG/DL (ref 65–99)
HCT VFR BLD AUTO: 33.9 % (ref 34–46.6)
HGB BLD-MCNC: 10.6 G/DL (ref 12–15.9)
IMM GRANULOCYTES # BLD AUTO: 0.09 10*3/MM3 (ref 0–0.05)
IMM GRANULOCYTES NFR BLD AUTO: 0.8 % (ref 0–0.5)
LYMPHOCYTES # BLD AUTO: 2.44 10*3/MM3 (ref 0.7–3.1)
LYMPHOCYTES NFR BLD AUTO: 22.7 % (ref 19.6–45.3)
MCH RBC QN AUTO: 29 PG (ref 26.6–33)
MCHC RBC AUTO-ENTMCNC: 31.3 G/DL (ref 31.5–35.7)
MCV RBC AUTO: 92.6 FL (ref 79–97)
METHADONE UR QL SCN: NEGATIVE
MONOCYTES # BLD AUTO: 0.7 10*3/MM3 (ref 0.1–0.9)
MONOCYTES NFR BLD AUTO: 6.5 % (ref 5–12)
NEUTROPHILS NFR BLD AUTO: 69.5 % (ref 42.7–76)
NEUTROPHILS NFR BLD AUTO: 7.49 10*3/MM3 (ref 1.7–7)
NRBC BLD AUTO-RTO: 0 /100 WBC (ref 0–0.2)
OPIATES UR QL: NEGATIVE
OXYCODONE UR QL SCN: NEGATIVE
PCP UR QL SCN: NEGATIVE
PLATELET # BLD AUTO: 235 10*3/MM3 (ref 140–450)
PMV BLD AUTO: 11.3 FL (ref 6–12)
POTASSIUM SERPL-SCNC: 4.1 MMOL/L (ref 3.5–5.2)
PROT SERPL-MCNC: 6.7 G/DL (ref 6–8.5)
RBC # BLD AUTO: 3.66 10*6/MM3 (ref 3.77–5.28)
RH BLD: POSITIVE
SODIUM SERPL-SCNC: 138 MMOL/L (ref 136–145)
T&S EXPIRATION DATE: NORMAL
TRICYCLICS UR QL SCN: NEGATIVE
WBC NRBC COR # BLD AUTO: 10.77 10*3/MM3 (ref 3.4–10.8)

## 2024-11-05 PROCEDURE — 86780 TREPONEMA PALLIDUM: CPT | Performed by: OBSTETRICS & GYNECOLOGY

## 2024-11-05 PROCEDURE — 86900 BLOOD TYPING SEROLOGIC ABO: CPT | Performed by: OBSTETRICS & GYNECOLOGY

## 2024-11-05 PROCEDURE — 80307 DRUG TEST PRSMV CHEM ANLYZR: CPT | Performed by: OBSTETRICS & GYNECOLOGY

## 2024-11-05 PROCEDURE — 86850 RBC ANTIBODY SCREEN: CPT | Performed by: OBSTETRICS & GYNECOLOGY

## 2024-11-05 PROCEDURE — 86901 BLOOD TYPING SEROLOGIC RH(D): CPT | Performed by: OBSTETRICS & GYNECOLOGY

## 2024-11-05 PROCEDURE — 80053 COMPREHEN METABOLIC PANEL: CPT | Performed by: OBSTETRICS & GYNECOLOGY

## 2024-11-05 PROCEDURE — 85025 COMPLETE CBC W/AUTO DIFF WBC: CPT | Performed by: OBSTETRICS & GYNECOLOGY

## 2024-11-05 PROCEDURE — 59025 FETAL NON-STRESS TEST: CPT

## 2024-11-05 RX ORDER — LIDOCAINE HYDROCHLORIDE 10 MG/ML
0.5 INJECTION, SOLUTION INFILTRATION; PERINEURAL ONCE AS NEEDED
Status: DISCONTINUED | OUTPATIENT
Start: 2024-11-05 | End: 2024-11-06 | Stop reason: HOSPADM

## 2024-11-05 RX ORDER — ONDANSETRON 4 MG/1
4 TABLET, ORALLY DISINTEGRATING ORAL EVERY 6 HOURS PRN
Status: DISCONTINUED | OUTPATIENT
Start: 2024-11-05 | End: 2024-11-06 | Stop reason: HOSPADM

## 2024-11-05 RX ORDER — SODIUM CHLORIDE, SODIUM LACTATE, POTASSIUM CHLORIDE, CALCIUM CHLORIDE 600; 310; 30; 20 MG/100ML; MG/100ML; MG/100ML; MG/100ML
125 INJECTION, SOLUTION INTRAVENOUS CONTINUOUS
Status: ACTIVE | OUTPATIENT
Start: 2024-11-06 | End: 2024-11-07

## 2024-11-05 RX ORDER — ACETAMINOPHEN 325 MG/1
650 TABLET ORAL EVERY 4 HOURS PRN
Status: DISCONTINUED | OUTPATIENT
Start: 2024-11-05 | End: 2024-11-06 | Stop reason: HOSPADM

## 2024-11-05 RX ORDER — METFORMIN HYDROCHLORIDE 500 MG/1
500 TABLET, EXTENDED RELEASE ORAL 2 TIMES DAILY
COMMUNITY
End: 2024-11-08 | Stop reason: HOSPADM

## 2024-11-05 RX ORDER — SODIUM CHLORIDE 0.9 % (FLUSH) 0.9 %
10 SYRINGE (ML) INJECTION AS NEEDED
Status: DISCONTINUED | OUTPATIENT
Start: 2024-11-05 | End: 2024-11-06 | Stop reason: HOSPADM

## 2024-11-05 RX ORDER — MINERAL OIL
OIL (ML) MISCELLANEOUS AS NEEDED
Status: DISCONTINUED | OUTPATIENT
Start: 2024-11-05 | End: 2024-11-06 | Stop reason: HOSPADM

## 2024-11-05 RX ORDER — MISOPROSTOL 100 UG/1
25 TABLET ORAL EVERY 4 HOURS PRN
Status: DISCONTINUED | OUTPATIENT
Start: 2024-11-05 | End: 2024-11-08 | Stop reason: HOSPADM

## 2024-11-05 RX ORDER — SODIUM CHLORIDE 9 MG/ML
40 INJECTION, SOLUTION INTRAVENOUS AS NEEDED
Status: DISCONTINUED | OUTPATIENT
Start: 2024-11-05 | End: 2024-11-06 | Stop reason: HOSPADM

## 2024-11-05 RX ORDER — ONDANSETRON 2 MG/ML
4 INJECTION INTRAMUSCULAR; INTRAVENOUS EVERY 6 HOURS PRN
Status: DISCONTINUED | OUTPATIENT
Start: 2024-11-05 | End: 2024-11-06 | Stop reason: HOSPADM

## 2024-11-05 RX ORDER — PRENATAL VIT/IRON FUM/FOLIC AC 27MG-0.8MG
1 TABLET ORAL DAILY
Status: DISCONTINUED | OUTPATIENT
Start: 2024-11-06 | End: 2024-11-06 | Stop reason: SDUPTHER

## 2024-11-06 ENCOUNTER — ANESTHESIA (OUTPATIENT)
Dept: LABOR AND DELIVERY | Facility: HOSPITAL | Age: 34
End: 2024-11-06
Payer: COMMERCIAL

## 2024-11-06 ENCOUNTER — ANESTHESIA EVENT (OUTPATIENT)
Dept: LABOR AND DELIVERY | Facility: HOSPITAL | Age: 34
End: 2024-11-06
Payer: COMMERCIAL

## 2024-11-06 LAB
GLUCOSE BLDC GLUCOMTR-MCNC: 88 MG/DL (ref 70–130)
TREPONEMA PALLIDUM IGG+IGM AB [PRESENCE] IN SERUM OR PLASMA BY IMMUNOASSAY: NORMAL

## 2024-11-06 PROCEDURE — C1755 CATHETER, INTRASPINAL: HCPCS

## 2024-11-06 PROCEDURE — 82948 REAGENT STRIP/BLOOD GLUCOSE: CPT

## 2024-11-06 PROCEDURE — 25810000003 SODIUM CHLORIDE 0.9 % SOLUTION: Performed by: OBSTETRICS & GYNECOLOGY

## 2024-11-06 PROCEDURE — 25010000002 LIDOCAINE 1 % SOLUTION: Performed by: NURSE ANESTHETIST, CERTIFIED REGISTERED

## 2024-11-06 PROCEDURE — 25810000003 LACTATED RINGERS PER 1000 ML: Performed by: OBSTETRICS & GYNECOLOGY

## 2024-11-06 PROCEDURE — 25010000002 LIDOCAINE PF 2% 2 % SOLUTION: Performed by: NURSE ANESTHETIST, CERTIFIED REGISTERED

## 2024-11-06 PROCEDURE — 25010000002 FENTANYL CITRATE (PF) 50 MCG/ML SOLUTION: Performed by: NURSE ANESTHETIST, CERTIFIED REGISTERED

## 2024-11-06 PROCEDURE — 25010000002 OXYTOCIN PER 10 UNITS: Performed by: OBSTETRICS & GYNECOLOGY

## 2024-11-06 PROCEDURE — C1755 CATHETER, INTRASPINAL: HCPCS | Performed by: NURSE ANESTHETIST, CERTIFIED REGISTERED

## 2024-11-06 RX ORDER — DOCUSATE SODIUM 100 MG/1
100 CAPSULE, LIQUID FILLED ORAL 2 TIMES DAILY
Status: DISCONTINUED | OUTPATIENT
Start: 2024-11-06 | End: 2024-11-06 | Stop reason: SDUPTHER

## 2024-11-06 RX ORDER — IBUPROFEN 800 MG/1
800 TABLET, FILM COATED ORAL 3 TIMES DAILY
Status: DISCONTINUED | OUTPATIENT
Start: 2024-11-06 | End: 2024-11-08 | Stop reason: HOSPADM

## 2024-11-06 RX ORDER — HYDROCODONE BITARTRATE AND ACETAMINOPHEN 5; 325 MG/1; MG/1
1 TABLET ORAL EVERY 4 HOURS PRN
Status: DISCONTINUED | OUTPATIENT
Start: 2024-11-06 | End: 2024-11-08 | Stop reason: HOSPADM

## 2024-11-06 RX ORDER — ACETAMINOPHEN 325 MG/1
650 TABLET ORAL EVERY 6 HOURS PRN
Status: DISCONTINUED | OUTPATIENT
Start: 2024-11-06 | End: 2024-11-08 | Stop reason: HOSPADM

## 2024-11-06 RX ORDER — PRENATAL VIT/IRON FUM/FOLIC AC 27MG-0.8MG
1 TABLET ORAL DAILY
Status: DISCONTINUED | OUTPATIENT
Start: 2024-11-07 | End: 2024-11-08 | Stop reason: HOSPADM

## 2024-11-06 RX ORDER — HYDROCORTISONE ACETATE PRAMOXINE HCL 1; 1 G/100G; G/100G
1 CREAM TOPICAL AS NEEDED
Status: DISCONTINUED | OUTPATIENT
Start: 2024-11-06 | End: 2024-11-08 | Stop reason: HOSPADM

## 2024-11-06 RX ORDER — MISOPROSTOL 100 UG/1
600 TABLET ORAL ONCE AS NEEDED
Status: DISCONTINUED | OUTPATIENT
Start: 2024-11-06 | End: 2024-11-08 | Stop reason: HOSPADM

## 2024-11-06 RX ORDER — ACETAMINOPHEN 325 MG/1
650 TABLET ORAL EVERY 4 HOURS PRN
Status: DISCONTINUED | OUTPATIENT
Start: 2024-11-06 | End: 2024-11-08 | Stop reason: HOSPADM

## 2024-11-06 RX ORDER — HYDROCODONE BITARTRATE AND ACETAMINOPHEN 10; 325 MG/1; MG/1
1 TABLET ORAL EVERY 4 HOURS PRN
Status: DISCONTINUED | OUTPATIENT
Start: 2024-11-06 | End: 2024-11-06 | Stop reason: SDUPTHER

## 2024-11-06 RX ORDER — SODIUM CHLORIDE 0.9 % (FLUSH) 0.9 %
1-10 SYRINGE (ML) INJECTION AS NEEDED
Status: DISCONTINUED | OUTPATIENT
Start: 2024-11-06 | End: 2024-11-08 | Stop reason: HOSPADM

## 2024-11-06 RX ORDER — ONDANSETRON 4 MG/1
4 TABLET, ORALLY DISINTEGRATING ORAL EVERY 6 HOURS PRN
Status: DISCONTINUED | OUTPATIENT
Start: 2024-11-06 | End: 2024-11-08 | Stop reason: HOSPADM

## 2024-11-06 RX ORDER — DOCUSATE SODIUM 100 MG/1
100 CAPSULE, LIQUID FILLED ORAL 2 TIMES DAILY
Status: DISCONTINUED | OUTPATIENT
Start: 2024-11-07 | End: 2024-11-08 | Stop reason: HOSPADM

## 2024-11-06 RX ORDER — ONDANSETRON 2 MG/ML
4 INJECTION INTRAMUSCULAR; INTRAVENOUS EVERY 6 HOURS PRN
Status: DISCONTINUED | OUTPATIENT
Start: 2024-11-06 | End: 2024-11-08 | Stop reason: HOSPADM

## 2024-11-06 RX ORDER — IBUPROFEN 800 MG/1
800 TABLET, FILM COATED ORAL EVERY 8 HOURS SCHEDULED
Status: DISCONTINUED | OUTPATIENT
Start: 2024-11-06 | End: 2024-11-06 | Stop reason: SDUPTHER

## 2024-11-06 RX ORDER — METHYLERGONOVINE MALEATE 0.2 MG/ML
200 INJECTION INTRAVENOUS ONCE AS NEEDED
Status: DISCONTINUED | OUTPATIENT
Start: 2024-11-06 | End: 2024-11-08 | Stop reason: HOSPADM

## 2024-11-06 RX ORDER — LIDOCAINE HYDROCHLORIDE AND EPINEPHRINE 15; 5 MG/ML; UG/ML
INJECTION, SOLUTION EPIDURAL AS NEEDED
Status: DISCONTINUED | OUTPATIENT
Start: 2024-11-06 | End: 2024-11-06 | Stop reason: SURG

## 2024-11-06 RX ORDER — HYDROCORTISONE 25 MG/G
1 CREAM TOPICAL AS NEEDED
Status: DISCONTINUED | OUTPATIENT
Start: 2024-11-06 | End: 2024-11-08 | Stop reason: HOSPADM

## 2024-11-06 RX ORDER — CARBOPROST TROMETHAMINE 250 UG/ML
250 INJECTION, SOLUTION INTRAMUSCULAR AS NEEDED
Status: DISCONTINUED | OUTPATIENT
Start: 2024-11-06 | End: 2024-11-06 | Stop reason: HOSPADM

## 2024-11-06 RX ORDER — BISACODYL 10 MG
10 SUPPOSITORY, RECTAL RECTAL DAILY PRN
Status: DISCONTINUED | OUTPATIENT
Start: 2024-11-07 | End: 2024-11-08 | Stop reason: HOSPADM

## 2024-11-06 RX ORDER — OXYTOCIN/0.9 % SODIUM CHLORIDE 30/500 ML
125 PLASTIC BAG, INJECTION (ML) INTRAVENOUS CONTINUOUS PRN
Status: DISCONTINUED | OUTPATIENT
Start: 2024-11-06 | End: 2024-11-08 | Stop reason: HOSPADM

## 2024-11-06 RX ORDER — FENTANYL/ROPIVACAINE/NS/PF 2MCG/ML-.2
14 PLASTIC BAG, INJECTION (ML) INJECTION CONTINUOUS
Status: DISCONTINUED | OUTPATIENT
Start: 2024-11-06 | End: 2024-11-06

## 2024-11-06 RX ORDER — OXYTOCIN/0.9 % SODIUM CHLORIDE 30/500 ML
125 PLASTIC BAG, INJECTION (ML) INTRAVENOUS ONCE AS NEEDED
Status: DISCONTINUED | OUTPATIENT
Start: 2024-11-06 | End: 2024-11-08 | Stop reason: HOSPADM

## 2024-11-06 RX ORDER — CARBOPROST TROMETHAMINE 250 UG/ML
250 INJECTION, SOLUTION INTRAMUSCULAR
Status: DISCONTINUED | OUTPATIENT
Start: 2024-11-06 | End: 2024-11-08 | Stop reason: HOSPADM

## 2024-11-06 RX ORDER — OXYTOCIN/0.9 % SODIUM CHLORIDE 30/500 ML
2-20 PLASTIC BAG, INJECTION (ML) INTRAVENOUS
Status: DISCONTINUED | OUTPATIENT
Start: 2024-11-06 | End: 2024-11-08 | Stop reason: HOSPADM

## 2024-11-06 RX ORDER — HYDROCODONE BITARTRATE AND ACETAMINOPHEN 10; 325 MG/1; MG/1
1 TABLET ORAL EVERY 4 HOURS PRN
Status: DISCONTINUED | OUTPATIENT
Start: 2024-11-06 | End: 2024-11-08 | Stop reason: HOSPADM

## 2024-11-06 RX ORDER — MISOPROSTOL 100 UG/1
600 TABLET ORAL AS NEEDED
Status: DISCONTINUED | OUTPATIENT
Start: 2024-11-06 | End: 2024-11-06 | Stop reason: HOSPADM

## 2024-11-06 RX ORDER — HYDROXYZINE HYDROCHLORIDE 25 MG/1
50 TABLET, FILM COATED ORAL NIGHTLY PRN
Status: DISCONTINUED | OUTPATIENT
Start: 2024-11-06 | End: 2024-11-08 | Stop reason: HOSPADM

## 2024-11-06 RX ORDER — FENTANYL CITRATE 50 UG/ML
100 INJECTION, SOLUTION INTRAMUSCULAR; INTRAVENOUS ONCE
Status: COMPLETED | OUTPATIENT
Start: 2024-11-06 | End: 2024-11-06

## 2024-11-06 RX ORDER — IBUPROFEN 800 MG/1
800 TABLET, FILM COATED ORAL ONCE AS NEEDED
Status: DISCONTINUED | OUTPATIENT
Start: 2024-11-06 | End: 2024-11-08 | Stop reason: HOSPADM

## 2024-11-06 RX ORDER — METHYLERGONOVINE MALEATE 0.2 MG/ML
200 INJECTION INTRAVENOUS ONCE AS NEEDED
Status: DISCONTINUED | OUTPATIENT
Start: 2024-11-06 | End: 2024-11-06 | Stop reason: HOSPADM

## 2024-11-06 RX ORDER — BISACODYL 10 MG
10 SUPPOSITORY, RECTAL RECTAL DAILY PRN
Status: DISCONTINUED | OUTPATIENT
Start: 2024-11-07 | End: 2024-11-06

## 2024-11-06 RX ORDER — EPHEDRINE SULFATE 5 MG/ML
10 INJECTION INTRAVENOUS
Status: DISCONTINUED | OUTPATIENT
Start: 2024-11-06 | End: 2024-11-06 | Stop reason: HOSPADM

## 2024-11-06 RX ORDER — LIDOCAINE HYDROCHLORIDE 10 MG/ML
INJECTION, SOLUTION INFILTRATION; PERINEURAL AS NEEDED
Status: DISCONTINUED | OUTPATIENT
Start: 2024-11-06 | End: 2024-11-06 | Stop reason: SURG

## 2024-11-06 RX ORDER — OXYTOCIN/0.9 % SODIUM CHLORIDE 30/500 ML
999 PLASTIC BAG, INJECTION (ML) INTRAVENOUS ONCE
Status: DISCONTINUED | OUTPATIENT
Start: 2024-11-06 | End: 2024-11-08 | Stop reason: HOSPADM

## 2024-11-06 RX ORDER — HYDROCODONE BITARTRATE AND ACETAMINOPHEN 5; 325 MG/1; MG/1
1 TABLET ORAL EVERY 4 HOURS PRN
Status: DISCONTINUED | OUTPATIENT
Start: 2024-11-06 | End: 2024-11-06 | Stop reason: SDUPTHER

## 2024-11-06 RX ORDER — LIDOCAINE HYDROCHLORIDE 20 MG/ML
INJECTION, SOLUTION EPIDURAL; INFILTRATION; INTRACAUDAL; PERINEURAL AS NEEDED
Status: DISCONTINUED | OUTPATIENT
Start: 2024-11-06 | End: 2024-11-06 | Stop reason: SURG

## 2024-11-06 RX ORDER — HYDROXYZINE HYDROCHLORIDE 25 MG/1
50 TABLET, FILM COATED ORAL NIGHTLY PRN
Status: DISCONTINUED | OUTPATIENT
Start: 2024-11-06 | End: 2024-11-06 | Stop reason: SDUPTHER

## 2024-11-06 RX ORDER — OXYTOCIN/0.9 % SODIUM CHLORIDE 30/500 ML
250 PLASTIC BAG, INJECTION (ML) INTRAVENOUS CONTINUOUS
Status: ACTIVE | OUTPATIENT
Start: 2024-11-06 | End: 2024-11-06

## 2024-11-06 RX ADMIN — EPHEDRINE SULFATE 10 MG: 5 INJECTION INTRAVENOUS at 10:40

## 2024-11-06 RX ADMIN — Medication: at 14:02

## 2024-11-06 RX ADMIN — Medication 25 MCG: at 03:40

## 2024-11-06 RX ADMIN — IBUPROFEN 800 MG: 800 TABLET, FILM COATED ORAL at 14:04

## 2024-11-06 RX ADMIN — EPHEDRINE SULFATE 10 MG: 5 INJECTION INTRAVENOUS at 10:54

## 2024-11-06 RX ADMIN — LIDOCAINE HYDROCHLORIDE AND EPINEPHRINE 3 ML: 15; 5 INJECTION, SOLUTION EPIDURAL at 10:20

## 2024-11-06 RX ADMIN — IBUPROFEN 800 MG: 800 TABLET, FILM COATED ORAL at 17:49

## 2024-11-06 RX ADMIN — Medication 14 ML/HR: at 10:24

## 2024-11-06 RX ADMIN — MISOPROSTOL 600 MCG: 100 TABLET ORAL at 11:35

## 2024-11-06 RX ADMIN — LIDOCAINE HYDROCHLORIDE 3 ML: 10 INJECTION, SOLUTION INFILTRATION; PERINEURAL at 10:18

## 2024-11-06 RX ADMIN — OXYTOCIN 2 MILLI-UNITS/MIN: 10 INJECTION, SOLUTION INTRAMUSCULAR; INTRAVENOUS at 07:45

## 2024-11-06 RX ADMIN — FENTANYL CITRATE 100 MCG: 50 INJECTION INTRAMUSCULAR; INTRAVENOUS at 10:23

## 2024-11-06 RX ADMIN — WITCH HAZEL 1 PAD: 500 SOLUTION RECTAL; TOPICAL at 14:02

## 2024-11-06 RX ADMIN — SODIUM CHLORIDE, POTASSIUM CHLORIDE, SODIUM LACTATE AND CALCIUM CHLORIDE 125 ML/HR: 600; 310; 30; 20 INJECTION, SOLUTION INTRAVENOUS at 10:37

## 2024-11-06 RX ADMIN — LIDOCAINE HYDROCHLORIDE 4 ML: 20 INJECTION, SOLUTION EPIDURAL; INFILTRATION; INTRACAUDAL; PERINEURAL at 10:23

## 2024-11-06 RX ADMIN — METFORMIN HYDROCHLORIDE 500 MG: 500 TABLET ORAL at 17:49

## 2024-11-06 NOTE — ANESTHESIA PREPROCEDURE EVALUATION
Anesthesia Evaluation     Patient summary reviewed and Nursing notes reviewed   no history of anesthetic complications:   NPO Solid Status: > 8 hours  NPO Liquid Status: > 8 hours           Airway   Mallampati: II  TM distance: >3 FB  Neck ROM: full  No difficulty expected  Dental - normal exam     Pulmonary - negative pulmonary ROS and normal exam    breath sounds clear to auscultation  Cardiovascular - negative cardio ROS and normal exam    Rhythm: regular  Rate: normal        Neuro/Psych- negative ROS  GI/Hepatic/Renal/Endo - negative ROS   (+) diabetes mellitus gestational    Musculoskeletal (-) negative ROS    Abdominal  - normal exam   Substance History - negative use     OB/GYN    (+) Pregnant        Other - negative ROS                         Anesthesia Plan    ASA 2     epidural       Anesthetic plan, risks, benefits, and alternatives have been provided, discussed and informed consent has been obtained with: patient.    Use of blood products discussed with patient  Consented to blood products.        CODE STATUS:    Level Of Support Discussed With: Patient  Code Status (Patient has no pulse and is not breathing): CPR (Attempt to Resuscitate)  Medical Interventions (Patient has pulse or is breathing): Full Support

## 2024-11-06 NOTE — H&P
Chief complaint   Chief Complaint   Patient presents with    Scheduled Induction     PT ARRIVED TO L/D FOR SCHEDULED IOL FOR GDM.       History of Present Illness: Patient is a 34 y.o. female who presents with IUP at 39w2d weeks gestation. G 3, P 2       Past Medical History:   Diagnosis Date    Allergies     Gestational diabetes 11/2024     Blood Type: A   Fetal Gender: Female  GBS: Negative    Past Surgical History:   Procedure Laterality Date    TONSILLECTOMY       Family History   Problem Relation Age of Onset    Hypertension Mother     Hypertension Father     Heart disease Father     COPD Father      Social History     Tobacco Use    Smoking status: Never    Smokeless tobacco: Never   Substance Use Topics    Alcohol use: Never    Drug use: Never     Medications Prior to Admission   Medication Sig Dispense Refill Last Dose/Taking    metFORMIN ER (GLUCOPHAGE-XR) 500 MG 24 hr tablet Take 1 tablet by mouth 2 (Two) Times a Day.   11/5/2024 Evening    Prenatal Vit-Fe Fumarate-FA (prenatal vitamin 27-0.8) 27-0.8 MG tablet tablet Take 1 tablet by mouth Daily.   11/5/2024 Morning     Allergies:  Patient has no known allergies.      Vital Signs  Temp:  [96.8 °F (36 °C)-97.8 °F (36.6 °C)] 97.8 °F (36.6 °C)  Heart Rate:  [85-93] 88  Resp:  [18] 18  BP: (112-118)/(71-77) 112/77    Radiology  Imaging Results (Last 24 Hours)       ** No results found for the last 24 hours. **            Labs  Lab Results (last 24 hours)       Procedure Component Value Units Date/Time    POC Glucose Once [[id number]]  (Normal) Collected: 11/06/24 0641    Specimen: Blood Updated: 11/06/24 0648     Glucose 88 mg/dL     Comprehensive Metabolic Panel [[id number]]  (Abnormal) Collected: 11/05/24 2129    Specimen: Blood Updated: 11/05/24 2210     Glucose 93 mg/dL      BUN 10 mg/dL      Creatinine 0.76 mg/dL      Sodium 138 mmol/L      Potassium 4.1 mmol/L      Comment: Slight hemolysis detected by analyzer. Result may be falsely elevated.         Chloride 106 mmol/L      CO2 16.7 mmol/L      Calcium 9.3 mg/dL      Total Protein 6.7 g/dL      Albumin 3.4 g/dL      ALT (SGPT) 8 U/L      AST (SGOT) 18 U/L      Alkaline Phosphatase 117 U/L      Total Bilirubin 0.3 mg/dL      Globulin 3.3 gm/dL      A/G Ratio 1.0 g/dL      BUN/Creatinine Ratio 13.2     Anion Gap 15.3 mmol/L      eGFR 106.3 mL/min/1.73     Narrative:      GFR Normal >60  Chronic Kidney Disease <60  Kidney Failure <15      Urine Drug Screen - Urine, Clean Catch [[id number]]  (Normal) Collected: 11/05/24 2122    Specimen: Urine, Clean Catch Updated: 11/05/24 2155     THC, Screen, Urine Negative     Phencyclidine (PCP), Urine Negative     Cocaine Screen, Urine Negative     Methamphetamine, Ur Negative     Opiate Screen Negative     Amphetamine Screen, Urine Negative     Benzodiazepine Screen, Urine Negative     Tricyclic Antidepressants Screen Negative     Methadone Screen, Urine Negative     Barbiturates Screen, Urine Negative     Oxycodone Screen, Urine Negative     Buprenorphine, Screen, Urine Negative    Narrative:      Cutoff For Drugs Screened:    Amphetamines               500 ng/ml  Barbiturates               200 ng/ml  Benzodiazepines            150 ng/ml  Cocaine                    150 ng/ml  Methadone                  200 ng/ml  Opiates                    100 ng/ml  Phencyclidine               25 ng/ml  THC                         50 ng/ml  Methamphetamine            500 ng/ml  Tricyclic Antidepressants  300 ng/ml  Oxycodone                  100 ng/ml  Buprenorphine               10 ng/ml    The normal value for all drugs tested is negative. This report includes unconfirmed screening results, with the cutoff values listed, to be used for medical treatment purposes only.  Unconfirmed results must not be used for non-medical purposes such as employment or legal testing.  Clinical consideration should be applied to any drug of abuse test, particularly when unconfirmed results are used.       Fentanyl, Urine - Urine, Clean Catch [[id number]]  (Normal) Collected: 11/05/24 2122    Specimen: Urine, Clean Catch Updated: 11/05/24 2154     Fentanyl, Urine Negative    Narrative:      Negative Threshold:      Fentanyl 5 ng/mL     The normal value for the drug tested is negative. This report includes final unconfirmed screening results to be used for medical treatment purposes only. Unconfirmed results must not be used for non-medical purposes such as employment or legal testing. Clinical consideration should be applied to any drug of abuse test, particularly when unconfirmed results are used.           CBC & Differential [[id number]]  (Abnormal) Collected: 11/05/24 2129    Specimen: Blood Updated: 11/05/24 2144    Narrative:      The following orders were created for panel order CBC & Differential.  Procedure                               Abnormality         Status                     ---------                               -----------         ------                     CBC Auto Differential[[id number]]        Abnormal            Final result                 Please view results for these tests on the individual orders.    CBC Auto Differential [[id number]]  (Abnormal) Collected: 11/05/24 2129    Specimen: Blood Updated: 11/05/24 2144     WBC 10.77 10*3/mm3      RBC 3.66 10*6/mm3      Hemoglobin 10.6 g/dL      Hematocrit 33.9 %      MCV 92.6 fL      MCH 29.0 pg      MCHC 31.3 g/dL      RDW 14.6 %      RDW-SD 48.6 fl      MPV 11.3 fL      Platelets 235 10*3/mm3      Neutrophil % 69.5 %      Lymphocyte % 22.7 %      Monocyte % 6.5 %      Eosinophil % 0.4 %      Basophil % 0.1 %      Immature Grans % 0.8 %      Neutrophils, Absolute 7.49 10*3/mm3      Lymphocytes, Absolute 2.44 10*3/mm3      Monocytes, Absolute 0.70 10*3/mm3      Eosinophils, Absolute 0.04 10*3/mm3      Basophils, Absolute 0.01 10*3/mm3      Immature Grans, Absolute 0.09 10*3/mm3      nRBC 0.0 /100 WBC     Treponema pallidum AB w/Reflex RPR [[id number]]  Collected: 11/05/24 2128    Specimen: Blood Updated: 11/05/24 2142    Group B Streptococcus Culture - Swab, Vaginal/Rectum [[id number]] Resulted: 10/16/24    Specimen: Swab from Vaginal/Rectum Updated: 11/05/24 2131     External Strep Group B Ag Negative    RPR Qualitative with Reflex to Quant [[id number]] Resulted: 04/24/24    Specimen: Blood Updated: 11/05/24 2118     External RPR Non-Reactive    Rubella Antibody, IgG [[id number]] Resulted: 04/24/24    Specimen: Blood Updated: 11/05/24 2118     External Rubella Qual Immune    HIV-1 Antibody, EIA [[id number]] Resulted: 04/24/24    Specimen: Blood Updated: 11/05/24 2118     External HIV Antibody Non-Reactive    Hepatitis B Surface Antigen [[id number]] Resulted: 04/24/24    Specimen: Blood Updated: 11/05/24 2118     External Hepatitis B Surface Ag Negative              Review of Systems    The following systems were reviewed and negative;  ENT, respiratory, cardiovascular, gastrointestinal, genitourinary, breast, endocrine and allergies / immunologic.      Physical Exam:      General Appearance:    Alert, cooperative, in no acute distress   Head:    Normocephalic, without obvious abnormality, atraumatic   Eyes:            Lids and lashes normal, conjunctivae and sclerae normal, no   icterus, no pallor, corneas clear, PERRLA   Ears:    Ears appear intact with no abnormalities noted   Throat:   No oral lesions, no thrush, oral mucosa moist   Neck:   No adenopathy, supple, trachea midline, no thyromegaly, no     carotid bruit, no JVD   Back:     No kyphosis present, no scoliosis present, no skin lesions,       erythema or scars, no tenderness to percussion or                   palpation,   range of motion normal   Lungs:     Clear to auscultation,respirations regular, even and                   unlabored    Heart:    Regular rhythm and normal rate, normal S1 and S2, no            murmur, no gallop, no rub, no click   Breast Exam:    Deferred   Abdomen:     Normal bowel  sounds, no masses, no organomegaly, soft        non-tender, non-distended, no guarding, no rebound                 tenderness   Genitalia:    Cervix: Dilated 2-3cm, 50%   Extremities:   Moves all extremities well, no edema, no cyanosis, no              redness   Pulses:   Pulses palpable and equal bilaterally   Skin:   No bleeding, bruising or rash   Lymph nodes:   No palpable adenopathy   Neurologic:   Cranial nerves 2 - 12 grossly intact, sensation intact, DTR        present and equal bilaterally         Assessment: Patient is a 34 y.o. female who presents with IUP at 39w2d weeks gestation. G 3, P 2.   Chief Complaint   Patient presents with    Scheduled Induction     PT ARRIVED TO L/D FOR SCHEDULED IOL FOR GDM.       Plan of Care: Admit. Proceed with IOL      Tariq Gonzalez III, MD  11/06/24  09:29 EST

## 2024-11-06 NOTE — ANESTHESIA PROCEDURE NOTES
Labor Epidural      Patient reassessed immediately prior to procedure    Patient location during procedure: OB  Start Time: 11/6/2024 10:17 AM  Stop Time: 11/6/2024 10:20 AM  Indication:at surgeon's request  Performed By  CRNA/ARGELIA: Anil Barnes CRNA  Preanesthetic Checklist  Completed: patient identified, IV checked, site marked, risks and benefits discussed, surgical consent, monitors and equipment checked, pre-op evaluation and timeout performed  Prep:  Pt Position:sitting  Sterile Tech:cap, gloves, mask and sterile barrier  Prep:povidone-iodine 7.5% surgical scrub  Monitoring:blood pressure monitoring and continuous pulse oximetry  Epidural Block Procedure:  Approach:midline  Guidance:landmark technique  Location:L3-L4  Needle Type:Tuohy  Needle Gauge:18 G  Loss of Resistance Medium: saline  Loss of Resistance: 7cm  Cath Depth at skin:14 cm  Paresthesia: none  Aspiration:negative  Test Dose:negative  Number of Attempts: 1  Post Assessment:  Dressing:secured with tape and occlusive dressing applied  Pt Tolerance:patient tolerated the procedure well with no apparent complications  Complications:no

## 2024-11-06 NOTE — L&D DELIVERY NOTE
Vaginal Delivery Procedure Note    Hoa Waldron  39w 2d  G 3,       OBGYN: Tariq Gonzalez MD      Pre-op Diagnosis: Complete Dilation      Anesthesia: Epidual        Detailed Description of Procedure     The patient was prepped and draped in normal sterile fashion. The head was delivered over an intact perineum. The nares and mouth were bulb suctioned. There was no nuchal cord. Anterior and posterior shoulders delivered without any problems. The rest of the infant was delivered in controlled fashion. The placenta delivered intact. The patient tolerated the procedure well and went to the recovery room in stable condition.        Maternal Blood Type: A   Fetal Gender: Female  Nuchal Cord: No  Tears: No    Amniotic Fluid Clear  Blood Cord: Yes  Estimated Blood Loss: 300cc  Placenta: Spontaneous, Delivered Intact   Uterus Explored: Yes  Membranes: AROM  APGARS: 8 & 9    Disposition: Transfer to Women's Health Floor  Condition: Stable    Tariq Gonzalez M.D     Date: 11/6/2024  Time: 12:01 EST

## 2024-11-06 NOTE — PLAN OF CARE
Goal Outcome Evaluation:  Plan of Care Reviewed With: patient        Progress: improving  Outcome Evaluation: VSS. NST REACTIVE. CYTOTEC PLACED. PLAN OF CARE ONGOING.

## 2024-11-07 LAB
BASOPHILS # BLD AUTO: 0.01 10*3/MM3 (ref 0–0.2)
BASOPHILS NFR BLD AUTO: 0.1 % (ref 0–1.5)
DEPRECATED RDW RBC AUTO: 51.8 FL (ref 37–54)
EOSINOPHIL # BLD AUTO: 0.05 10*3/MM3 (ref 0–0.4)
EOSINOPHIL NFR BLD AUTO: 0.4 % (ref 0.3–6.2)
ERYTHROCYTE [DISTWIDTH] IN BLOOD BY AUTOMATED COUNT: 14.5 % (ref 12.3–15.4)
HCT VFR BLD AUTO: 31.4 % (ref 34–46.6)
HGB BLD-MCNC: 9.5 G/DL (ref 12–15.9)
IMM GRANULOCYTES # BLD AUTO: 0.08 10*3/MM3 (ref 0–0.05)
IMM GRANULOCYTES NFR BLD AUTO: 0.7 % (ref 0–0.5)
LYMPHOCYTES # BLD AUTO: 1.85 10*3/MM3 (ref 0.7–3.1)
LYMPHOCYTES NFR BLD AUTO: 16.1 % (ref 19.6–45.3)
MCH RBC QN AUTO: 29.9 PG (ref 26.6–33)
MCHC RBC AUTO-ENTMCNC: 30.3 G/DL (ref 31.5–35.7)
MCV RBC AUTO: 98.7 FL (ref 79–97)
MONOCYTES # BLD AUTO: 0.83 10*3/MM3 (ref 0.1–0.9)
MONOCYTES NFR BLD AUTO: 7.2 % (ref 5–12)
NEUTROPHILS NFR BLD AUTO: 75.5 % (ref 42.7–76)
NEUTROPHILS NFR BLD AUTO: 8.7 10*3/MM3 (ref 1.7–7)
NRBC BLD AUTO-RTO: 0 /100 WBC (ref 0–0.2)
PLATELET # BLD AUTO: 166 10*3/MM3 (ref 140–450)
PMV BLD AUTO: 11.4 FL (ref 6–12)
RBC # BLD AUTO: 3.18 10*6/MM3 (ref 3.77–5.28)
WBC NRBC COR # BLD AUTO: 11.52 10*3/MM3 (ref 3.4–10.8)

## 2024-11-07 PROCEDURE — 85025 COMPLETE CBC W/AUTO DIFF WBC: CPT | Performed by: OBSTETRICS & GYNECOLOGY

## 2024-11-07 RX ADMIN — IBUPROFEN 800 MG: 800 TABLET, FILM COATED ORAL at 20:27

## 2024-11-07 RX ADMIN — IBUPROFEN 800 MG: 800 TABLET, FILM COATED ORAL at 15:02

## 2024-11-07 RX ADMIN — IBUPROFEN 800 MG: 800 TABLET, FILM COATED ORAL at 09:35

## 2024-11-07 RX ADMIN — ACETAMINOPHEN 650 MG: 325 TABLET ORAL at 00:23

## 2024-11-07 RX ADMIN — DOCUSATE SODIUM 100 MG: 100 CAPSULE, LIQUID FILLED ORAL at 20:27

## 2024-11-07 RX ADMIN — PRENATAL VITAMINS-IRON FUMARATE 27 MG IRON-FOLIC ACID 0.8 MG TABLET 1 TABLET: at 09:35

## 2024-11-07 RX ADMIN — DOCUSATE SODIUM 100 MG: 100 CAPSULE, LIQUID FILLED ORAL at 09:35

## 2024-11-07 NOTE — PLAN OF CARE
Goal Outcome Evaluation:   Voiding without difficulty/ small rubra/ breastfeeding well/ afebrile/ ambulating well

## 2024-11-07 NOTE — PROGRESS NOTES
Spontaneous Vaginal Delivery Progress Note      Hospital Course: G 3, now P 3003. Patient was admitted on 11/5/2024  9:11 PM with IUP at 39w2d weeks gestation secondary to Pregnancy [Z34.90]. Patient underwent a normal spontaneous vaginal delivery.       Patient stable. No complaints.     signs in last 24 hours:    Vital Signs Range for the last 24 hours              Temp:  [97.5 °F (36.4 °C)-98.2 °F (36.8 °C)] 97.9 °F (36.6 °C)  Heart Rate:  [] 82  Resp:  [16-20] 16  BP: ()/(54-89) 96/67     Radiology     Imaging Results (Last 24 Hours)       ** No results found for the last 24 hours. **             Labs     Lab Results (last 24 hours)       Procedure Component Value Units Date/Time    CBC & Differential [[id number]]  (Abnormal) Collected: 11/07/24 0513    Specimen: Blood Updated: 11/07/24 0552    Narrative:      The following orders were created for panel order CBC & Differential.  Procedure                               Abnormality         Status                     ---------                               -----------         ------                     CBC Auto Differential[[id number]]        Abnormal            Final result                 Please view results for these tests on the individual orders.    CBC Auto Differential [[id number]]  (Abnormal) Collected: 11/07/24 0513    Specimen: Blood Updated: 11/07/24 0552     WBC 11.52 10*3/mm3      RBC 3.18 10*6/mm3      Hemoglobin 9.5 g/dL      Hematocrit 31.4 %      MCV 98.7 fL      MCH 29.9 pg      MCHC 30.3 g/dL      RDW 14.5 %      RDW-SD 51.8 fl      MPV 11.4 fL      Platelets 166 10*3/mm3      Neutrophil % 75.5 %      Lymphocyte % 16.1 %      Monocyte % 7.2 %      Eosinophil % 0.4 %      Basophil % 0.1 %      Immature Grans % 0.7 %      Neutrophils, Absolute 8.70 10*3/mm3      Lymphocytes, Absolute 1.85 10*3/mm3      Monocytes, Absolute 0.83 10*3/mm3      Eosinophils, Absolute 0.05 10*3/mm3      Basophils, Absolute 0.01 10*3/mm3      Immature Grans,  Absolute 0.08 10*3/mm3      nRBC 0.0 /100 WBC     Treponema pallidum AB w/Reflex RPR [[id number]]  (Normal) Collected: 24    Specimen: Blood Updated: 24 1506     Treponemal AB Total Non-Reactive    Narrative:      Reactive results will reflex RPR testing.              Review of Systems    The following systems were reviewed and negative;  ENT, respiratory, cardiovascular, gastrointestinal, genitourinary, breast, endocrine and allergies / immunologic.      Physical Exam:      General Appearance:    Alert, cooperative, in no acute distress   Head:    Normocephalic, without obvious abnormality, atraumatic   Eyes:            Lids and lashes normal, conjunctivae and sclerae normal, no   icterus, no pallor, corneas clear, PERRLA   Ears:    Ears appear intact with no abnormalities noted   Throat:   No oral lesions, no thrush, oral mucosa moist   Neck:   No adenopathy, supple, trachea midline, no thyromegaly, no     carotid bruit, no JVD   Back:     No kyphosis present, no scoliosis present, no skin lesions,       erythema or scars, no tenderness to percussion or                   palpation,   range of motion normal   Lungs:     Clear to auscultation,respirations regular, even and                   unlabored    Heart:    Regular rhythm and normal rate, normal S1 and S2, no            murmur, no gallop, no rub, no click   Breast Exam:    Deferred   Abdomen:     Normal bowel sounds, no masses, no organomegaly, soft        non-tender, non-distended, no guarding, no rebound                 tenderness   Genitalia:    Deferred   Extremities:   Moves all extremities well, no edema, no cyanosis, no              redness   Pulses:   Pulses palpable and equal bilaterally   Skin:   No bleeding, bruising or rash   Lymph nodes:   No palpable adenopathy   Neurologic:   Cranial nerves 2 - 12 grossly intact, sensation intact, DTR        present and equal bilaterally                 Assessment:  1.  . PPD#1. Patient doing  well. No complaints.     Plan:  1. Will continue current plan of care and anticipate discharge to home in the AM.      Tariq Gonzalez III, MD  11/07/24  07:51 EST

## 2024-11-07 NOTE — PLAN OF CARE
Goal Outcome Evaluation:                    Patient ambulating independently. Voiding spontaneously. Fundus firm and at midline. Bleeding WDL. VSS.

## 2024-11-08 VITALS
OXYGEN SATURATION: 98 % | RESPIRATION RATE: 20 BRPM | SYSTOLIC BLOOD PRESSURE: 106 MMHG | DIASTOLIC BLOOD PRESSURE: 77 MMHG | HEIGHT: 71 IN | WEIGHT: 239 LBS | BODY MASS INDEX: 33.46 KG/M2 | TEMPERATURE: 97.6 F | HEART RATE: 82 BPM

## 2024-11-08 PROBLEM — O24.415 GESTATIONAL DIABETES MELLITUS (GDM) CONTROLLED ON ORAL HYPOGLYCEMIC DRUG, ANTEPARTUM: Status: ACTIVE | Noted: 2024-11-08

## 2024-11-08 RX ADMIN — PRENATAL VITAMINS-IRON FUMARATE 27 MG IRON-FOLIC ACID 0.8 MG TABLET 1 TABLET: at 14:52

## 2024-11-08 RX ADMIN — IBUPROFEN 800 MG: 800 TABLET, FILM COATED ORAL at 14:52

## 2024-11-08 RX ADMIN — IBUPROFEN 800 MG: 800 TABLET, FILM COATED ORAL at 09:51

## 2024-11-08 RX ADMIN — DOCUSATE SODIUM 100 MG: 100 CAPSULE, LIQUID FILLED ORAL at 09:51

## 2024-11-08 NOTE — PROGRESS NOTES
Richard  Delivery Discharge Summary    Primary OB Clinician: Ryanne Ko DO    EDC: Estimated Date of Delivery: 24    Gestational Age:39w2d    Antepartum complications: none    Blood Type: A Positive    Date of Delivery: 2024  Time of Delivery: 11:28 AM    Delivered By:  Tariq Gonzalez Iii    Delivery Type: Vaginal, Spontaneous     Tubal Ligation: n/a    Baby:female infant;   Apgar:  8  @ 1 minute /   Apgar:  8  @ 5 minutes   Weight: 3890 g (8 lb 9.2 oz)   Length: 20.669    Anesthesia: Epidural     Intrapartum complications: None    Laceration: Yes  Laceration Information  Laceration Repaired?   Perineal: 1st Yes   Periurethral:       Labial:       Sulcus:       Vaginal:       Cervical:            Episiotomy: No    Placenta: Spontaneous    Feeding method: Breastfeeding Status: Yes    Discharge Date: 2024; Discharge Time: 14:51 EST    Plan:    Follow-up appointment with primary OB/GYN in 3 weeks.

## 2024-11-08 NOTE — PAYOR COMM NOTE
"[facility]  NPI:[id number]    Utilization Review  Contact: Cecile Stockton RN  Phone: [phone]  Fax:[phone]    INITIATE INPATIENT AUTHORIZATION  Hoa Taveras (34 y.o. Female)       Date of Birth   1990    Social Security Number       Address   [address] [facility] [address]    Home Phone   [phone]    MRN   [id number]       Spiritism   Anabaptism    Marital Status                               Admission Date   11/5/24    Admission Type   Elective    Admitting Provider   Tariq Gonzalez III, MD    Attending Provider   Tariq Gonzalez III, MD    Department, Room/Bed   [facility], W236/1       Discharge Date       Discharge Disposition       Discharge Destination                                 Attending Provider: Tariq Gonzalez III, MD    Allergies: No Known Allergies    Isolation: None   Infection: None   Code Status: CPR    Ht: 179.1 cm (70.5\")   Wt: 108 kg (239 lb)    Admission Cmt: None   Principal Problem: Pregnancy [Z34.90]                   Active Insurance as of 11/5/2024       Primary Coverage       Payor Plan Insurance Group Employer/Plan Group    ANTHEM BLUE CROSS ANTHEM BLUE CROSS BLUE SHIELD PPO [id number]       Payor Plan Address Payor Plan Phone Number Payor Plan Fax Number Effective Dates    [address] [phone]  1/1/2024 - None Entered    [place] [address]         Subscriber Name Subscriber Birth Date Member ID       NICKY HOA PITTS 1990 [id number]               Secondary Coverage       Payor Plan Insurance Group Employer/Plan Group    MEDICAID PENDING MEDICAID PENDING        Payor Plan Address Payor Plan Phone Number Payor Plan Fax Number Effective Dates       11/1/2024 - 12/31/2024      Subscriber Name Subscriber Birth Date Member ID       NICKY HOA PITTS 1990                      Emergency Contacts        (Rel.) Home Phone Work Phone Mobile Phone    LAURA SAUNDERS (Mother) [phone] -- --      "            History & Physical        Tariq Gonzalez III, MD at 11/06/24 0928                Chief complaint   Chief Complaint   Patient presents with    Scheduled Induction     PT ARRIVED TO L/D FOR SCHEDULED IOL FOR GDM.       History of Present Illness: Patient is a 34 y.o. female who presents with IUP at 39w2d weeks gestation. G 3, P 2       Past Medical History:   Diagnosis Date    Allergies     Gestational diabetes 11/2024     Blood Type: A   Fetal Gender: Female  GBS: Negative    Past Surgical History:   Procedure Laterality Date    TONSILLECTOMY       Family History   Problem Relation Age of Onset    Hypertension Mother     Hypertension Father     Heart disease Father     COPD Father      Social History     Tobacco Use    Smoking status: Never    Smokeless tobacco: Never   Substance Use Topics    Alcohol use: Never    Drug use: Never     Medications Prior to Admission   Medication Sig Dispense Refill Last Dose/Taking    metFORMIN ER (GLUCOPHAGE-XR) 500 MG 24 hr tablet Take 1 tablet by mouth 2 (Two) Times a Day.   11/5/2024 Evening    Prenatal Vit-Fe Fumarate-FA (prenatal vitamin 27-0.8) 27-0.8 MG tablet tablet Take 1 tablet by mouth Daily.   11/5/2024 Morning     Allergies:  Patient has no known allergies.      Vital Signs  Temp:  [96.8 °F (36 °C)-97.8 °F (36.6 °C)] 97.8 °F (36.6 °C)  Heart Rate:  [85-93] 88  Resp:  [18] 18  BP: (112-118)/(71-77) 112/77    Radiology  Imaging Results (Last 24 Hours)       ** No results found for the last 24 hours. **            Labs  Lab Results (last 24 hours)       Procedure Component Value Units Date/Time    POC Glucose Once [[id number]]  (Normal) Collected: 11/06/24 0641    Specimen: Blood Updated: 11/06/24 0648     Glucose 88 mg/dL     Comprehensive Metabolic Panel [[id number]]  (Abnormal) Collected: 11/05/24 2129    Specimen: Blood Updated: 11/05/24 2210     Glucose 93 mg/dL      BUN 10 mg/dL      Creatinine 0.76 mg/dL      Sodium 138 mmol/L      Potassium 4.1 mmol/L       Comment: Slight hemolysis detected by analyzer. Result may be falsely elevated.        Chloride 106 mmol/L      CO2 16.7 mmol/L      Calcium 9.3 mg/dL      Total Protein 6.7 g/dL      Albumin 3.4 g/dL      ALT (SGPT) 8 U/L      AST (SGOT) 18 U/L      Alkaline Phosphatase 117 U/L      Total Bilirubin 0.3 mg/dL      Globulin 3.3 gm/dL      A/G Ratio 1.0 g/dL      BUN/Creatinine Ratio 13.2     Anion Gap 15.3 mmol/L      eGFR 106.3 mL/min/1.73     Narrative:      GFR Normal >60  Chronic Kidney Disease <60  Kidney Failure <15      Urine Drug Screen - Urine, Clean Catch [[id number]]  (Normal) Collected: 11/05/24 2122    Specimen: Urine, Clean Catch Updated: 11/05/24 2155     THC, Screen, Urine Negative     Phencyclidine (PCP), Urine Negative     Cocaine Screen, Urine Negative     Methamphetamine, Ur Negative     Opiate Screen Negative     Amphetamine Screen, Urine Negative     Benzodiazepine Screen, Urine Negative     Tricyclic Antidepressants Screen Negative     Methadone Screen, Urine Negative     Barbiturates Screen, Urine Negative     Oxycodone Screen, Urine Negative     Buprenorphine, Screen, Urine Negative    Narrative:      Cutoff For Drugs Screened:    Amphetamines               500 ng/ml  Barbiturates               200 ng/ml  Benzodiazepines            150 ng/ml  Cocaine                    150 ng/ml  Methadone                  200 ng/ml  Opiates                    100 ng/ml  Phencyclidine               25 ng/ml  THC                         50 ng/ml  Methamphetamine            500 ng/ml  Tricyclic Antidepressants  300 ng/ml  Oxycodone                  100 ng/ml  Buprenorphine               10 ng/ml    The normal value for all drugs tested is negative. This report includes unconfirmed screening results, with the cutoff values listed, to be used for medical treatment purposes only.  Unconfirmed results must not be used for non-medical purposes such as employment or legal testing.  Clinical consideration should be  applied to any drug of abuse test, particularly when unconfirmed results are used.      Fentanyl, Urine - Urine, Clean Catch [[id number]]  (Normal) Collected: 11/05/24 2122    Specimen: Urine, Clean Catch Updated: 11/05/24 2154     Fentanyl, Urine Negative    Narrative:      Negative Threshold:      Fentanyl 5 ng/mL     The normal value for the drug tested is negative. This report includes final unconfirmed screening results to be used for medical treatment purposes only. Unconfirmed results must not be used for non-medical purposes such as employment or legal testing. Clinical consideration should be applied to any drug of abuse test, particularly when unconfirmed results are used.           CBC & Differential [[id number]]  (Abnormal) Collected: 11/05/24 2129    Specimen: Blood Updated: 11/05/24 2144    Narrative:      The following orders were created for panel order CBC & Differential.  Procedure                               Abnormality         Status                     ---------                               -----------         ------                     CBC Auto Differential[[id number]]        Abnormal            Final result                 Please view results for these tests on the individual orders.    CBC Auto Differential [[id number]]  (Abnormal) Collected: 11/05/24 2129    Specimen: Blood Updated: 11/05/24 2144     WBC 10.77 10*3/mm3      RBC 3.66 10*6/mm3      Hemoglobin 10.6 g/dL      Hematocrit 33.9 %      MCV 92.6 fL      MCH 29.0 pg      MCHC 31.3 g/dL      RDW 14.6 %      RDW-SD 48.6 fl      MPV 11.3 fL      Platelets 235 10*3/mm3      Neutrophil % 69.5 %      Lymphocyte % 22.7 %      Monocyte % 6.5 %      Eosinophil % 0.4 %      Basophil % 0.1 %      Immature Grans % 0.8 %      Neutrophils, Absolute 7.49 10*3/mm3      Lymphocytes, Absolute 2.44 10*3/mm3      Monocytes, Absolute 0.70 10*3/mm3      Eosinophils, Absolute 0.04 10*3/mm3      Basophils, Absolute 0.01 10*3/mm3      Immature Grans, Absolute  0.09 10*3/mm3      nRBC 0.0 /100 WBC     Treponema pallidum AB w/Reflex RPR [[id number]] Collected: 11/05/24 2128    Specimen: Blood Updated: 11/05/24 2142    Group B Streptococcus Culture - Swab, Vaginal/Rectum [[id number]] Resulted: 10/16/24    Specimen: Swab from Vaginal/Rectum Updated: 11/05/24 2131     External Strep Group B Ag Negative    RPR Qualitative with Reflex to Quant [[id number]] Resulted: 04/24/24    Specimen: Blood Updated: 11/05/24 2118     External RPR Non-Reactive    Rubella Antibody, IgG [[id number]] Resulted: 04/24/24    Specimen: Blood Updated: 11/05/24 2118     External Rubella Qual Immune    HIV-1 Antibody, EIA [[id number]] Resulted: 04/24/24    Specimen: Blood Updated: 11/05/24 2118     External HIV Antibody Non-Reactive    Hepatitis B Surface Antigen [[id number]] Resulted: 04/24/24    Specimen: Blood Updated: 11/05/24 2118     External Hepatitis B Surface Ag Negative              Review of Systems    The following systems were reviewed and negative;  ENT, respiratory, cardiovascular, gastrointestinal, genitourinary, breast, endocrine and allergies / immunologic.      Physical Exam:      General Appearance:    Alert, cooperative, in no acute distress   Head:    Normocephalic, without obvious abnormality, atraumatic   Eyes:            Lids and lashes normal, conjunctivae and sclerae normal, no   icterus, no pallor, corneas clear, PERRLA   Ears:    Ears appear intact with no abnormalities noted   Throat:   No oral lesions, no thrush, oral mucosa moist   Neck:   No adenopathy, supple, trachea midline, no thyromegaly, no     carotid bruit, no JVD   Back:     No kyphosis present, no scoliosis present, no skin lesions,       erythema or scars, no tenderness to percussion or                   palpation,   range of motion normal   Lungs:     Clear to auscultation,respirations regular, even and                   unlabored    Heart:    Regular rhythm and normal rate, normal S1 and S2, no            murmur,  no gallop, no rub, no click   Breast Exam:    Deferred   Abdomen:     Normal bowel sounds, no masses, no organomegaly, soft        non-tender, non-distended, no guarding, no rebound                 tenderness   Genitalia:    Cervix: Dilated 2-3cm, 50%   Extremities:   Moves all extremities well, no edema, no cyanosis, no              redness   Pulses:   Pulses palpable and equal bilaterally   Skin:   No bleeding, bruising or rash   Lymph nodes:   No palpable adenopathy   Neurologic:   Cranial nerves 2 - 12 grossly intact, sensation intact, DTR        present and equal bilaterally         Assessment: Patient is a 34 y.o. female who presents with IUP at 39w2d weeks gestation. G 3, P 2.   Chief Complaint   Patient presents with    Scheduled Induction     PT ARRIVED TO L/D FOR SCHEDULED IOL FOR GDM.       Plan of Care: Admit. Proceed with IOL      Tariq Gonzalez III, MD  11/06/24  09:29 EST      Electronically signed by Tariq Gonzalez III, MD at 11/06/24 0930       H&P signed by [employer], [employer] at 11/06/24 0811         [Media Unavailable] Scan on 11/6/2024 0802 by [employer], [employer]: OB [facility]ATAL H&P, [facility], 11/05/2024          Electronically signed by [employer], [employer] at 11/06/24 0811       Emergency Department Notes    No notes of this type exist for this encounter.       Vital Signs (last 4 days)       Date/Time Temp Temp src Pulse Resp BP Patient Position SpO2    11/08/24 0800 97.6 (36.4) Oral 82 20 106/77 Lying 98    11/07/24 1959 97.5 (36.4) Oral 89 18 117/82 Lying 97    11/07/24 0730 97.8 (36.6) Oral 82 18 123/81 Lying 98    11/06/24 2030 97.9 (36.6) Oral 82 16 96/67 Lying 97    11/06/24 1345 98.2 (36.8) Oral 101 18 121/73 Sitting 98    11/06/24 1315 -- -- 96 18 113/69 -- --    11/06/24 1307 -- -- 107 18 116/77 -- --    11/06/24 1257 -- -- 99 20 105/60 -- --    11/06/24 1245 -- -- 97 20 106/70 -- --    11/06/24 1239 -- -- 93 20 115/71 -- --    11/06/24 1227 -- -- 99 18 117/70 -- --     11/06/24 1215 -- -- 93 20 117/64 -- --    11/06/24 1207 -- -- 99 -- 112/59 -- --    11/06/24 1157 -- -- 113 -- 109/57 -- --    11/06/24 1145 -- -- 114 20 112/58 -- --    11/06/24 1137 -- -- 109 -- 112/56 -- --    11/06/24 1130 98 (36.7) -- -- 20 -- -- --    11/06/24 1129 -- -- 135 -- -- -- 100    11/06/24 1118 -- -- 121 -- 112/72 -- --    11/06/24 1115 -- -- 116 -- -- -- 99    11/06/24 1106 -- -- 90 -- 114/64 -- --    11/06/24 1104 -- -- 115 -- -- -- 97    11/06/24 1100 -- -- 117 20 117/62 -- 96    11/06/24 1056 -- -- 66 -- 140/60 -- --    11/06/24 1053 -- -- 119 -- 86/54 -- 100    11/06/24 1048 -- -- 115 -- 102/59 -- 97    11/06/24 1045 -- -- 92 -- 106/61 -- 95    11/06/24 1037 -- -- 102 -- 98/62 -- 93    11/06/24 1035 -- -- 116 -- 90/55 -- --    11/06/24 1033 -- -- 109 -- -- -- 98    11/06/24 1030 97.5 (36.4) -- 108 -- 128/72 -- --    11/06/24 1028 -- -- 94 -- -- -- 98    11/06/24 1027 -- -- 97 -- 127/80 -- --    11/06/24 1024 -- -- 95 -- 125/75 -- --    11/06/24 1023 -- -- 97 -- -- -- 96    11/06/24 1021 -- -- 101 -- 133/88 -- --    11/06/24 1018 -- -- 87 -- 124/85 -- 99    11/06/24 1013 -- -- 89 -- -- -- 99    11/06/24 0950 -- -- 75 -- 122/89 -- --    11/06/24 0920 -- -- 89 -- 120/82 -- --    11/06/24 0850 -- -- 94 -- 118/79 -- --    11/06/24 0820 -- -- 85 -- 103/64 -- --    11/06/24 0819 -- -- 85 -- 103/64 -- --    11/06/24 0750 -- -- 88 18 112/77 -- --    11/06/24 0707 97.8 (36.6) -- -- 18 -- -- --    11/06/24 0706 96.8 (36) -- 85 -- 118/71 -- --    11/06/24 0300 -- -- 93 -- 117/72 -- --    11/05/24 2126 97.7 (36.5) Oral 91 18 118/73 Lying --          Facility-Administered Medications as of 11/8/2024   Medication Dose Route Frequency Provider Last Rate Last Admin    acetaminophen (TYLENOL) tablet 650 mg  650 mg Oral Q4H PRN Tariq Gonzalez III, MD        acetaminophen (TYLENOL) tablet 650 mg  650 mg Oral Q6H PRN Tariq Gonzalez III, MD   650 mg at 11/07/24 0023    acetaminophen (TYLENOL) tablet 650 mg  650 mg  Oral Q6H PRN Tariq Gonzalez III, MD        benzocaine (AMERICAINE) 20 % rectal ointment 1 Application  1 Application Rectal PRN Tariq Gonzalez III, MD        benzocaine (AMERICAINE) 20 % rectal ointment 1 Application  1 Application Rectal PRN Tariq Gonzalez III, MD benzocaine-menthol (DERMOPLAST) 20-0.5 % topical spray   Topical PRN Tariq Gonzalez III, MD        bisacodyl (DULCOLAX) suppository 10 mg  10 mg Rectal Daily PRN Tariq Gonzalez III, MD        carboprost (HEMABATE) injection 250 mcg  250 mcg Intramuscular Q30 Min PRN Tariq Gonzalez III, MD        docusate sodium (COLACE) capsule 100 mg  100 mg Oral BID Tariq Gonzalez III, MD   100 mg at 11/08/24 0951    [COMPLETED] fentaNYL citrate (PF) (SUBLIMAZE) injection 100 mcg  100 mcg Epidural Once Anil Barnes CRNA   100 mcg at 11/06/24 1023    fentaNYL citrate (PF) (SUBLIMAZE) injection   Epidural PRN Rui Serrato MD   100 mcg at 07/15/22 1400    HYDROcodone-acetaminophen (NORCO) 5-325 MG per tablet 1 tablet  1 tablet Oral Q4H PRN Tariq Gonzalez III, MD        Or    HYDROcodone-acetaminophen (NORCO)  MG per tablet 1 tablet  1 tablet Oral Q4H PRN Tariq Gonzalez III, MD        HYDROcodone-acetaminophen (NORCO) 5-325 MG per tablet 1 tablet  1 tablet Oral Q4H PRN Tariq Gonzalez III, MD        Hydrocortisone (Perianal) (ANUSOL-HC) 2.5 % rectal cream 1 Application  1 Application Rectal PRN Tariq Gonzalez III, MD        Hydrocortisone (Perianal) (ANUSOL-HC) 2.5 % rectal cream 1 Application  1 Application Rectal PRN Tariq Gonzalez III, MD        Hydrocortisone Ace-Pramoxine 1-1 % rectal cream 1 Application  1 Application Rectal PRN Tariq Gonzalez III, MD        hydrOXYzine (ATARAX) tablet 50 mg  50 mg Oral Nightly PRN Tariq Gonzalez III, MD        ibuprofen (ADVIL,MOTRIN) tablet 800 mg  800 mg Oral Once PRN Tariq Gonzalez III, MD        ibuprofen (ADVIL,MOTRIN) tablet 800 mg  800 mg Oral TID Tariq Gonzalez III, MD   800 mg at 11/08/24  0951    [] lactated ringers infusion  125 mL/hr Intravenous Continuous Tariq Gonzalez III,  mL/hr at 24 1037 125 mL/hr at 24 1037    lanolin topical 1 Application  1 Application Topical Q1H PRN Tariq Gonzalez III, MD        lidocaine-EPINEPHrine (XYLOCAINE W/EPI) 1.5 %-1:187676 injection   Epidural PRN Rui Serrato MD   3 mL at 07/15/22 1357    magnesium hydroxide (MILK OF MAGNESIA) suspension 10 mL  10 mL Oral Daily PRN Tariq Gonzalez III, MD        methylergonovine (METHERGINE) injection 200 mcg  200 mcg Intramuscular Once PRN Tariq Gonzalez III, MD        miSOPROStol (CYTOTEC) tablet 25 mcg  25 mcg Vaginal Q4H PRN Tariq Gonzalez III, MD   25 mcg at 24 0340    miSOPROStol (CYTOTEC) tablet 600 mcg  600 mcg Oral Once PRN Tariq Gonzalez III, MD        ondansetron ODT (ZOFRAN-ODT) disintegrating tablet 4 mg  4 mg Oral Q6H PRN Tariq Gonzalez III, MD        Or    ondansetron (ZOFRAN) injection 4 mg  4 mg Intravenous Q6H PRN Tariq Gonzalez III, MD        ondansetron ODT (ZOFRAN-ODT) disintegrating tablet 4 mg  4 mg Oral Q6H PRN Tariq Gonzalez III, MD        Or    ondansetron (ZOFRAN) injection 4 mg  4 mg Intravenous Q6H PRN Tariq Gonzalez III, MD        oxytocin (PITOCIN) 30 units in 0.9% sodium chloride 500 mL (premix)  999 mL/hr Intravenous Once Tariq Gonzalez III, MD        Followed by    [] oxytocin (PITOCIN) 30 units in 0.9% sodium chloride 500 mL (premix)  250 mL/hr Intravenous Continuous Tariq Gonzalez III, MD        oxytocin (PITOCIN) 30 units in 0.9% sodium chloride 500 mL (premix)  2-20 anisa-units/min Intravenous Titrated Tariq Gonzalez III, MD 4 mL/hr at 24 0815 4 anisa-units/min at 24 0815    oxytocin (PITOCIN) 30 units in 0.9% sodium chloride 500 mL (premix)  125 mL/hr Intravenous Once PRN Tariq Gonzalez III, MD        oxytocin (PITOCIN) 30 units in 0.9% sodium chloride 500 mL (premix)  125 mL/hr Intravenous Continuous PRN Tariq Gonzalez III, MD         "prenatal vitamin tablet 1 tablet  1 tablet Oral Daily Tariq Gonzalez III, MD   1 tablet at 11/07/24 0935    ropivacaine (NAROPIN) 0.5 % injection   Epidural PRN Rui Serrato MD   7 mL at 07/15/22 1400    sodium chloride 0.9 % flush 1-10 mL  1-10 mL Intravenous PRN Tariq Gonzalez III, MD        witch hazel-glycerin (TUCKS) pad   Topical PRN Tariq Gonzalez III, MD         Orders (all)        Start     Ordered    11/07/24 0900  docusate sodium (COLACE) capsule 100 mg  2 Times Daily         11/06/24 1442    11/07/24 0900  prenatal vitamin tablet 1 tablet  Daily         11/06/24 1442    11/07/24 0800  Sitz Bath  3 Times Daily        Comments: PRN    11/06/24 1351    11/07/24 0800  Sitz Bath  3 Times Daily        Comments: PRN    11/06/24 1442    11/07/24 0600  CBC & Differential  Morning Draw        Comments: Postpartum Day 1      11/06/24 1351    11/07/24 0600  CBC Auto Differential  PROCEDURE ONCE        Comments: Postpartum Day 1      11/06/24 2202    11/07/24 0000  bisacodyl (DULCOLAX) suppository 10 mg  Daily PRN,   Status:  Discontinued         11/06/24 1351    11/07/24 0000  bisacodyl (DULCOLAX) suppository 10 mg  Daily PRN         11/06/24 1442    11/06/24 2100  docusate sodium (COLACE) capsule 100 mg  2 Times Daily,   Status:  Discontinued         11/06/24 1351    11/06/24 1600  ibuprofen (ADVIL,MOTRIN) tablet 800 mg  3 times daily         11/06/24 1442    11/06/24 1500  oxytocin (PITOCIN) 30 units in 0.9% sodium chloride 500 mL (premix)  Continuous        Placed in \"Followed by\" Linked Group    11/06/24 1348    11/06/24 1500  ibuprofen (ADVIL,MOTRIN) tablet 800 mg  Every 8 Hours Scheduled,   Status:  Discontinued         11/06/24 1351    11/06/24 1445  oxytocin (PITOCIN) 30 units in 0.9% sodium chloride 500 mL (premix)  Once        Placed in \"Followed by\" Linked Group    11/06/24 1348    11/06/24 1443  Place Sequential Compression Device  Once         11/06/24 1442    11/06/24 1443  Maintain Sequential " Compression Device  Continuous         11/06/24 1442    11/06/24 1442  Code Status and Medical Interventions: CPR (Attempt to Resuscitate); Full  Continuous         11/06/24 1442    11/06/24 1442  Vital Signs Per Hospital Policy  Per Hospital Policy         11/06/24 1442    11/06/24 1442  Notify Physician  Until Discontinued         11/06/24 1442    11/06/24 1442  Up Ad Pinky  Until Discontinued         11/06/24 1442    11/06/24 1442  Diet: Regular/House; Fluid Consistency: Thin (IDDSI 0)  Diet Effective Now         11/06/24 1442    11/06/24 1442  Fundal and Lochia Check  Per Hospital Policy        Comments: Q 15 min x 4, Q 30 min x 2, then Q Shift    11/06/24 1442    11/06/24 1442  RN to Assess Rh Status & Place RhIG Evaluation Order if Indicated  Continuous         11/06/24 1442    11/06/24 1442  Bladder Assessment  Per Order Details        Comments: Postpartum 1) Upon Admission to Unit & Every 4 Hours PRN Until Voiding. 2) Out of Bed to Void in 8 Hours.    11/06/24 1442    11/06/24 1442  Straight Cath  Per Order Details        Comments: Postpartum: If Distended & Unable to Void, May Repeat Once.    11/06/24 1442 11/06/24 1442  Indwelling Urinary Catheter  Per Order Details        Comments: Postpartum : After Straight Cathed x2 or if Greater Than 1000mL Residual, Insert Indwelling Urinary Catheter Until Further MD Order.    11/06/24 1442 11/06/24 1442  Breast pump to bed  Once         11/06/24 1442    11/06/24 1442  If indicated -- Please administer RH Immunoglobulin based on results of cord blood evaluation and fetal screen lab tests, pharmacy to dispense  Continuous        Comments: See process instructions for reference range details.    11/06/24 1442    11/06/24 1442  VTE Prophylaxis Not Indicated: Low Risk; No Risk Factors (0)  Once         11/06/24 1442    11/06/24 1441  Hydrocortisone Ace-Pramoxine 1-1 % rectal cream 1 Application  As Needed         11/06/24 1442    11/06/24 1441  Hydrocortisone  "(Perianal) (ANUSOL-HC) 2.5 % rectal cream 1 Application  As Needed         11/06/24 1442    11/06/24 1441  benzocaine (AMERICAINE) 20 % rectal ointment 1 Application  As Needed         11/06/24 1442    11/06/24 1441  ondansetron ODT (ZOFRAN-ODT) disintegrating tablet 4 mg  Every 6 Hours PRN        Placed in \"Or\" Linked Group    11/06/24 1442    11/06/24 1441  ondansetron (ZOFRAN) injection 4 mg  Every 6 Hours PRN        Placed in \"Or\" Linked Group    11/06/24 1442    11/06/24 1441  sodium chloride 0.9 % flush 1-10 mL  As Needed         11/06/24 1442    11/06/24 1441  oxytocin (PITOCIN) 30 units in 0.9% sodium chloride 500 mL (premix)  Continuous PRN         11/06/24 1442    11/06/24 1441  acetaminophen (TYLENOL) tablet 650 mg  Every 6 Hours PRN         11/06/24 1442    11/06/24 1441  HYDROcodone-acetaminophen (NORCO) 5-325 MG per tablet 1 tablet  Every 4 Hours PRN        Placed in \"Or\" Linked Group    11/06/24 1442    11/06/24 1441  HYDROcodone-acetaminophen (NORCO)  MG per tablet 1 tablet  Every 4 Hours PRN        Placed in \"Or\" Linked Group    11/06/24 1442    11/06/24 1441  carboprost (HEMABATE) injection 250 mcg  Every 30 Minutes PRN         11/06/24 1442    11/06/24 1441  miSOPROStol (CYTOTEC) tablet 600 mcg  Once As Needed         11/06/24 1442    11/06/24 1441  methylergonovine (METHERGINE) injection 200 mcg  Once As Needed         11/06/24 1442    11/06/24 1441  hydrOXYzine (ATARAX) tablet 50 mg  Nightly PRN         11/06/24 1442    11/06/24 1441  magnesium hydroxide (MILK OF MAGNESIA) suspension 10 mL  Daily PRN         11/06/24 1442    11/06/24 1441  lanolin topical 1 Application  Every 1 Hour PRN         11/06/24 1442    11/06/24 1441  benzocaine-menthol (DERMOPLAST) 20-0.5 % topical spray  As Needed         11/06/24 1442    11/06/24 1441  witch hazel-glycerin (TUCKS) pad  As Needed         11/06/24 1442    11/06/24 1350  benzocaine-menthol (DERMOPLAST) 20-0.5 % topical spray  As Needed,   Status:  " Discontinued         11/06/24 1351    11/06/24 1349  hydrOXYzine (ATARAX) tablet 50 mg  Nightly PRN,   Status:  Discontinued         11/06/24 1351    11/06/24 1349  Notify Provider (Specified)  Until Discontinued         11/06/24 1348    11/06/24 1349  Vital Signs Per Hospital Policy  Per Hospital Policy         11/06/24 1348    11/06/24 1349  Up as Tolerated  Until Discontinued         11/06/24 1348    11/06/24 1349  Nurse May Remove Epidural Catheter After Delivery  Continuous         11/06/24 1348    11/06/24 1349  Transfer to Postpartum When Criteria Met  Continuous         11/06/24 1348    11/06/24 1349  Code Status and Medical Interventions: CPR (Attempt to Resuscitate); Full  Continuous,   Status:  Canceled         11/06/24 1351    11/06/24 1349  Vital Signs Per Hospital Policy  Per Hospital Policy         11/06/24 1351    11/06/24 1349  Notify Provider  Continuous        Comments: Open Order Report to View Parameters Requiring Provider Notification    11/06/24 1351    11/06/24 1349  Up Ad Pinky  Until Discontinued         11/06/24 1351    11/06/24 1349  Ambulate Patient  Every Shift       11/06/24 1351    11/06/24 1349  Diet: Regular/House; Fluid Consistency: Thin (IDDSI 0)  Diet Effective Now,   Status:  Canceled         11/06/24 1351    11/06/24 1349  Advance Diet As Tolerated -Regular  Until Discontinued         11/06/24 1351    11/06/24 1349  Fundal and Lochia Check  Per Order Details        Comments: Every 30 minutes x2, then Every Shift    11/06/24 1351    11/06/24 1349  RN to Assess Rh Status & Place RhIG Evaluation Order if Indicated  Continuous         11/06/24 1351    11/06/24 1349  Bladder Assessment  Per Order Details        Comments: Postpartum 1) Upon Admission to Unit & Every 4 Hours PRN Until Voiding. 2) Out of Bed to Void in 8 Hours.    11/06/24 1351    11/06/24 1349  Straight Cath  Per Order Details        Comments: Postpartum: If Distended & Unable to Void, May Repeat Once.    11/06/24 1351  "   11/06/24 1349  Indwelling Urinary Catheter  Per Order Details,   Status:  Canceled        Comments: Postpartum : After Straight Cathed x2 or if Greater Than 1000mL Residual, Insert Indwelling Urinary Catheter Until Further MD Order.    11/06/24 1351    11/06/24 1349  Apply Ice to Perineum  Per Order Details        Comments: For 20 Minutes Every 2 Hours    11/06/24 1351    11/06/24 1349  Waffle Cushion  Per Order Details        Comments: For Perineal Discomfort    11/06/24 1351    11/06/24 1349  Donut Ring  Per Order Details        Comments: For Perineal Pain    11/06/24 1351    11/06/24 1349  Kpad  Per Order Details        Comments: For Pain    11/06/24 1351    11/06/24 1349  Breast pump to bed  Once         11/06/24 1351    11/06/24 1349  If indicated -- Please administer RH Immunoglobulin based on results of cord blood evaluation and fetal screen lab tests, pharmacy to dispense  Per Order Details        Comments: See Process Instructions For Reference Range Details.    11/06/24 1351    11/06/24 1349  VTE Prophylaxis Not Indicated: Low Risk; No Risk Factors (0)  Once         11/06/24 1351    11/06/24 1348  oxytocin (PITOCIN) 30 units in 0.9% sodium chloride 500 mL (premix)  Once As Needed         11/06/24 1351    11/06/24 1348  acetaminophen (TYLENOL) tablet 650 mg  Every 6 Hours PRN         11/06/24 1351    11/06/24 1348  HYDROcodone-acetaminophen (NORCO) 5-325 MG per tablet 1 tablet  Every 4 Hours PRN,   Status:  Discontinued        Placed in \"Or\" Linked Group    11/06/24 1351    11/06/24 1348  HYDROcodone-acetaminophen (NORCO)  MG per tablet 1 tablet  Every 4 Hours PRN,   Status:  Discontinued        Placed in \"Or\" Linked Group    11/06/24 1351    11/06/24 1348  magnesium hydroxide (MILK OF MAGNESIA) suspension 10 mL  Daily PRN,   Status:  Discontinued         11/06/24 1351    11/06/24 1348  witch hazel-glycerin (TUCKS) pad 1 Pad  As Needed,   Status:  Discontinued         11/06/24 1351    11/06/24 1348 " " Hydrocortisone (Perianal) (ANUSOL-HC) 2.5 % rectal cream 1 Application  As Needed         11/06/24 1351    11/06/24 1348  benzocaine (AMERICAINE) 20 % rectal ointment 1 Application  As Needed         11/06/24 1351    11/06/24 1348  Fundal & Lochia Check  Every Shift       11/06/24 1348    11/06/24 1348  acetaminophen (TYLENOL) tablet 650 mg  Every 4 Hours PRN         11/06/24 1348    11/06/24 1348  ibuprofen (ADVIL,MOTRIN) tablet 800 mg  Once As Needed         11/06/24 1348    11/06/24 1348  HYDROcodone-acetaminophen (NORCO) 5-325 MG per tablet 1 tablet  Every 4 Hours PRN         11/06/24 1348    11/06/24 1348  ondansetron ODT (ZOFRAN-ODT) disintegrating tablet 4 mg  Every 6 Hours PRN        Placed in \"Or\" Linked Group    11/06/24 1348    11/06/24 1348  ondansetron (ZOFRAN) injection 4 mg  Every 6 Hours PRN        Placed in \"Or\" Linked Group    11/06/24 1348    11/06/24 1124  Diet: Regular/House; Fluid Consistency: Thin (IDDSI 0)  Diet Effective Now,   Status:  Canceled         11/06/24 1123    11/06/24 1123  methylergonovine (METHERGINE) injection 200 mcg  Once As Needed,   Status:  Discontinued         11/06/24 1123    11/06/24 1123  carboprost (HEMABATE) injection 250 mcg  As Needed,   Status:  Discontinued         11/06/24 1123    11/06/24 1123  miSOPROStol (CYTOTEC) tablet 600 mcg  As Needed,   Status:  Discontinued         11/06/24 1123    11/06/24 1100  lactated ringers bolus 1,000 mL  Once,   Status:  Discontinued         11/06/24 1008    11/06/24 1100  fentaNYL 2mcg/mL and ropivacaine 0.2% in NS epidural 100mL  Continuous,   Status:  Discontinued         11/06/24 1008    11/06/24 1100  fentaNYL citrate (PF) (SUBLIMAZE) injection 100 mcg  Once         11/06/24 1008    11/06/24 1009  Vital Signs Per Anesthesia Guidelines  Per Order Details        Comments: Every 2 Minutes x5, Every 5 Minutes x4, Then If Stable Every 15 Minutes    11/06/24 1008    11/06/24 1009  Start IV (16 or 18 Gauge)  Once         " 11/06/24 1008    11/06/24 1009  Fetal Heart Rate Monitor  Once         11/06/24 1008    11/06/24 1009  Nurse or Anesthesiologist to Remain With Patient for 15 Minutes Following Dosing  Continuous         11/06/24 1008    11/06/24 1009  Facilitate Maternal Position on Side & Maintain Uterine Displacement  Continuous         11/06/24 1008    11/06/24 1009  Consult Anesthesia Prior to Changing Epidural Infusion / Rate  Continuous         11/06/24 1008    11/06/24 1009  Notify Provider  Until Discontinued         11/06/24 1008    11/06/24 1008  ePHEDrine Sulfate (Pressors) 5 MG/ML injection 10 mg  Every 10 Minutes PRN,   Status:  Discontinued         11/06/24 1008 11/06/24 0900  prenatal vitamin 27-0.8 tablet 1 tablet  Daily,   Status:  Discontinued         11/05/24 2153 11/06/24 0800  lactated ringers infusion  Continuous         11/05/24 2121 11/06/24 0800  metFORMIN (GLUCOPHAGE) tablet 500 mg  2 Times Daily With Meals,   Status:  Discontinued         11/05/24 2153 11/06/24 0800  oxytocin (PITOCIN) 30 units in 0.9% sodium chloride 500 mL (premix)  Titrated         11/06/24 0708    11/06/24 0649  POC Glucose Once  PROCEDURE ONCE        Comments: Complete no more than 45 minutes prior to patient eating      11/06/24 0641    11/06/24 0000  Vital Signs q 4 while awake  Every 4 Hours      Comments: While the patient is awake.    11/05/24 2121 11/05/24 2257  miSOPROStol (CYTOTEC) tablet 25 mcg  Every 4 Hours PRN         11/05/24 2257 11/05/24 2143  Fentanyl, Urine - Urine, Clean Catch  Once         11/05/24 2142 11/05/24 2129  Comprehensive Metabolic Panel  STAT         11/05/24 2128 11/05/24 2121  Admit To Obstetrics Inpatient  Once         11/05/24 2121 11/05/24 2121  Code Status and Medical Interventions: CPR (Attempt to Resuscitate); Full Support  Continuous,   Status:  Canceled         11/05/24 2121 11/05/24 2121  Obtain Informed Consent  Once         11/05/24 2121 11/05/24 2121  Vital  Signs Per Hospital Policy  Per Hospital Policy         24  Confirm Presence of Amniotic Fluid if Patient Presents With SROM  Once         24  Keep Exams to a Minimum in Patient With SROM  Continuous         24  Mini-Prep Prior to Delivery  Once         24  Continuous Fetal Monitoring With NST on Admission and Prior to Initiation of Oxytocin.  Per Order Details        Comments: Continuous Fetal Monitoring With NST on Admission & Prior to Initiation of Oxytocin.    24  External Uterine Contraction Monitoring  Per Hospital Policy         24  Notify Provider (Specified)  Until Discontinued         24  Notify Provider of Tachysystole (Per Hospital Algorithm)  Until Discontinued         24  Notify Provider if Membranes Ruptured, Bleeding Greater Than 1 Pad Per Hour, Fetal Heart Tone Abnormality or Severe Pain  Until Discontinued         24  May Ambulate if Membranes Intact or Head Engaged With Ruptured BOW or Normal Tracing for 20 Minutes  Until Discontinued         24  Initiate Group Beta Strep (GBS) Prophylaxis Protocol, If Criteria Met  Continuous        Comments: NO TREATMENT RECOMMENDED IF: 1) Maternal GBS Status Known Negative 2) Scheduled  Birth With Intact Membranes, Not in Labor 3) Maternal GBS Status Unknown, No Risk Factors  TREAT WITH ANTIBIOTICS IF:  1) Maternal GBS Status Known Positive 2) Maternal GBS Status Unknown With Risk Factors: a)  Previous Infant Affected By GBS Infection b) GBS Urinary Tract Infection (UTI) or Bacteriuria During Pregnancy c) Unexplained Maternal Fever (100.4F (38C) or Greater) During Labor d)  Prolonged Rupture of Membranes (18 or More Hours) e) Gestational Age Less Than 37 Weeks    24     "11/05/24 2121  NPO Diet NPO Type: Ice Chips  Diet Effective Now,   Status:  Canceled         11/05/24 2121 11/05/24 2121  Treponema pallidum AB w/Reflex RPR  STAT         11/05/24 2121 11/05/24 2121  CBC & Differential  STAT         11/05/24 2121 11/05/24 2121  Urine Drug Screen - Urine, Clean Catch  STAT         11/05/24 2121 11/05/24 2121  Type & Screen  STAT         11/05/24 2121 11/05/24 2121  Insert Peripheral IV  Once         11/05/24 2121 11/05/24 2121  Saline Lock & Maintain IV Access  Continuous         11/05/24 2121 11/05/24 2121  VTE Prophylaxis Not Indicated: Low Risk; No Risk Factors (0)  Once         11/05/24 2121 11/05/24 2121  CBC Auto Differential  PROCEDURE ONCE         11/05/24 2122 11/05/24 2120  mineral oil  As Needed,   Status:  Discontinued         11/05/24 2121 11/05/24 2120  sodium chloride 0.9 % flush 10 mL  As Needed,   Status:  Discontinued         11/05/24 2121 11/05/24 2120  sodium chloride 0.9 % infusion 40 mL  As Needed,   Status:  Discontinued         11/05/24 2121 11/05/24 2120  lidocaine (XYLOCAINE) 1 % injection 0.5 mL  Once As Needed,   Status:  Discontinued         11/05/24 2121 11/05/24 2120  lactated ringers bolus 1,000 mL  Once As Needed,   Status:  Discontinued         11/05/24 2121 11/05/24 2120  acetaminophen (TYLENOL) tablet 650 mg  Every 4 Hours PRN,   Status:  Discontinued         11/05/24 2121 11/05/24 2120  butorphanol (STADOL) injection 2 mg  Every 3 Hours PRN,   Status:  Discontinued         11/05/24 2121 11/05/24 2120  ondansetron ODT (ZOFRAN-ODT) disintegrating tablet 4 mg  Every 6 Hours PRN,   Status:  Discontinued        Placed in \"Or\" Linked Group    11/05/24 2121 11/05/24 2120  ondansetron (ZOFRAN) injection 4 mg  Every 6 Hours PRN,   Status:  Discontinued        Placed in \"Or\" Linked Group    11/05/24 2121    11/05/24 0000  Hepatitis B Surface Antigen        Comments: This is an external result entered " through the Results Console.      11/05/24 2118 11/05/24 0000  RPR Qualitative with Reflex to Quant        Comments: This is an external result entered through the Results Console.      11/05/24 2118 11/05/24 0000  Rubella Antibody, IgG        Comments: This is an external result entered through the Results Console.      11/05/24 2118 11/05/24 0000  HIV-1 Antibody, EIA        Comments: This is an external result entered through the Results Console.      11/05/24 2118 11/05/24 0000  Group B Streptococcus Culture - Swab, Vaginal/Rectum        Comments: This is an external result entered through the Results Console.      11/05/24 2131    Unscheduled  Position Change - For Intra-Uterine Resusitation for Hypertonus, HyperStimulation or Non-Reassuring Fetal Status  As Needed       11/05/24 2121    Unscheduled  Fundal & Lochia Check  As Needed      Comments: Every 15 Minutes x4, Then Every 30 Minutes x2, Then Every Shift    11/06/24 1348    Unscheduled  Warm compress  As Needed       11/06/24 1351    Unscheduled  Apply ace wrap, tight bra, or binder  As Needed       11/06/24 1351    Unscheduled  Apply ice packs  As Needed       11/06/24 1351    Unscheduled  Apply Ice to Perineum  As Needed      Comments: For 20 min q 2 hrs    11/06/24 1442    Unscheduled  Kpad  As Needed      Comments: For pain    11/06/24 1442    Unscheduled  Warm compress  As Needed       11/06/24 1442    Unscheduled  Apply ace wrap, tight bra, or binder  As Needed       11/06/24 1442    Unscheduled  Apply ice packs  As Needed       11/06/24 1442    --  metFORMIN ER (GLUCOPHAGE-XR) 500 MG 24 hr tablet  2 Times Daily         11/05/24 2128                     Physician Progress Notes (all)        Tariq Gonzalez III, MD at 11/07/24 0751            Spontaneous Vaginal Delivery Progress Note      Hospital Course: G 3, now P 3003. Patient was admitted on 11/5/2024  9:11 PM with IUP at 39w2d weeks gestation secondary to Pregnancy [Z34.90]. Patient  underwent a normal spontaneous vaginal delivery.       Patient stable. No complaints.     signs in last 24 hours:    Vital Signs Range for the last 24 hours              Temp:  [97.5 °F (36.4 °C)-98.2 °F (36.8 °C)] 97.9 °F (36.6 °C)  Heart Rate:  [] 82  Resp:  [16-20] 16  BP: ()/(54-89) 96/67     Radiology     Imaging Results (Last 24 Hours)       ** No results found for the last 24 hours. **             Labs     Lab Results (last 24 hours)       Procedure Component Value Units Date/Time    CBC & Differential [[id number]]  (Abnormal) Collected: 11/07/24 0513    Specimen: Blood Updated: 11/07/24 0552    Narrative:      The following orders were created for panel order CBC & Differential.  Procedure                               Abnormality         Status                     ---------                               -----------         ------                     CBC Auto Differential[[id number]]        Abnormal            Final result                 Please view results for these tests on the individual orders.    CBC Auto Differential [[id number]]  (Abnormal) Collected: 11/07/24 0513    Specimen: Blood Updated: 11/07/24 0552     WBC 11.52 10*3/mm3      RBC 3.18 10*6/mm3      Hemoglobin 9.5 g/dL      Hematocrit 31.4 %      MCV 98.7 fL      MCH 29.9 pg      MCHC 30.3 g/dL      RDW 14.5 %      RDW-SD 51.8 fl      MPV 11.4 fL      Platelets 166 10*3/mm3      Neutrophil % 75.5 %      Lymphocyte % 16.1 %      Monocyte % 7.2 %      Eosinophil % 0.4 %      Basophil % 0.1 %      Immature Grans % 0.7 %      Neutrophils, Absolute 8.70 10*3/mm3      Lymphocytes, Absolute 1.85 10*3/mm3      Monocytes, Absolute 0.83 10*3/mm3      Eosinophils, Absolute 0.05 10*3/mm3      Basophils, Absolute 0.01 10*3/mm3      Immature Grans, Absolute 0.08 10*3/mm3      nRBC 0.0 /100 WBC     Treponema pallidum AB w/Reflex RPR [[id number]]  (Normal) Collected: 11/05/24 2128    Specimen: Blood Updated: 11/06/24 1506     Treponemal AB Total  Non-Reactive    Narrative:      Reactive results will reflex RPR testing.              Review of Systems    The following systems were reviewed and negative;  ENT, respiratory, cardiovascular, gastrointestinal, genitourinary, breast, endocrine and allergies / immunologic.      Physical Exam:      General Appearance:    Alert, cooperative, in no acute distress   Head:    Normocephalic, without obvious abnormality, atraumatic   Eyes:            Lids and lashes normal, conjunctivae and sclerae normal, no   icterus, no pallor, corneas clear, PERRLA   Ears:    Ears appear intact with no abnormalities noted   Throat:   No oral lesions, no thrush, oral mucosa moist   Neck:   No adenopathy, supple, trachea midline, no thyromegaly, no     carotid bruit, no JVD   Back:     No kyphosis present, no scoliosis present, no skin lesions,       erythema or scars, no tenderness to percussion or                   palpation,   range of motion normal   Lungs:     Clear to auscultation,respirations regular, even and                   unlabored    Heart:    Regular rhythm and normal rate, normal S1 and S2, no            murmur, no gallop, no rub, no click   Breast Exam:    Deferred   Abdomen:     Normal bowel sounds, no masses, no organomegaly, soft        non-tender, non-distended, no guarding, no rebound                 tenderness   Genitalia:    Deferred   Extremities:   Moves all extremities well, no edema, no cyanosis, no              redness   Pulses:   Pulses palpable and equal bilaterally   Skin:   No bleeding, bruising or rash   Lymph nodes:   No palpable adenopathy   Neurologic:   Cranial nerves 2 - 12 grossly intact, sensation intact, DTR        present and equal bilaterally         Assessment & Plan        Assessment:  1.  . PPD#1. Patient doing well. No complaints.     Plan:  1. Will continue current plan of care and anticipate discharge to home in the AM.      Tariq Gonzalez III, MD  24  07:51 EST    Electronically  signed by Tariq Gonzalez III, MD at 11/07/24 4211       Consult Notes (all)    No notes of this type exist for this encounter.

## 2024-11-08 NOTE — PROGRESS NOTES
"[facility]  Vaginal Delivery Progress Note    Subjective   Postpartum Day 2: Vaginal Delivery    The patient feels well.  Denies complaints.  Lochia is light.      Objective     Vital Signs Range for the last 24 hours  Temperature: Temp:  [97.5 °F (36.4 °C)-97.6 °F (36.4 °C)] 97.6 °F (36.4 °C)   Temp Source: Temp src: Oral   BP: BP: (106-117)/(77-82) 106/77   Pulse: Heart Rate:  [82-89] 82   Respirations: Resp:  [18-20] 20   SPO2: SpO2:  [97 %-98 %] 98 %   O2 Amount (l/min):     O2 Devices Device (Oxygen Therapy): room air   Weight:       Admit Height:  Height: 179.1 cm (70.5\")      Physical Exam:  General:  no acute distresss.  Abdomen: abdomen is soft without significant tenderness, masses, organomegaly or guarding.  Extremities: normal, atraumatic, no cyanosis, and trace edema.       Lab results reviewed:  Yes   Lab Results   Component Value Date    WBC 11.52 (H) 11/07/2024    HGB 9.5 (L) 11/07/2024    HCT 31.4 (L) 11/07/2024    MCV 98.7 (H) 11/07/2024     11/07/2024     Lab Results   Component Value Date    GLUCOSE 93 11/05/2024    BUN 10 11/05/2024    CREATININE 0.76 11/05/2024     11/05/2024    K 4.1 11/05/2024     11/05/2024    CALCIUM 9.3 11/05/2024    PROTEINTOT 6.7 11/05/2024    ALBUMIN 3.4 (L) 11/05/2024    ALT 8 11/05/2024    AST 18 11/05/2024    ALKPHOS 117 11/05/2024    BILITOT 0.3 11/05/2024    GLOB 3.3 11/05/2024    AGRATIO 1.0 11/05/2024    BCR 13.2 11/05/2024    ANIONGAP 15.3 (H) 11/05/2024    EGFR 106.3 11/05/2024           Assessment & Plan     Normal postpartum state    Gestation Diabetes, resolved    Plan:  Discharge home with standard precautions and return to clinic in 4-6 weeks.      Ryanne Ko DO  11/8/2024  14:55 EST  "

## 2024-11-18 ENCOUNTER — MATERNAL SCREENING (OUTPATIENT)
Dept: CALL CENTER | Facility: HOSPITAL | Age: 34
End: 2024-11-18
Payer: COMMERCIAL

## 2024-11-18 NOTE — OUTREACH NOTE
Maternal Screening Survey      Flowsheet Row Responses   Facility patient discharged from? [facility]   Attempt successful? Yes   Call start time 1135   Call end time 1137   Person spoke with today (if not patient) and relationship Patient   I have been able to laugh and see the funny side of things. 0   I have looked forward with enjoyment to things. 0   I have blamed myself unnecessarily when things went wrong. 2   I have been anxious or worried for no good reason. 0   I have felt scared or panicky for no good reason. 0   Things have been getting on top of me. 0   I have been so unhappy that I have had difficulty sleeping. 0   I have felt sad or miserable. 1   I have been so unhappy that I have been crying. 1   The thought of harming myself has occurred to me. 0   [place]  Depression Scale Total 4   Did any of your parents have problems with alcohol or drug use? No   Do any of your peers have problems with alcohol or drug use? No   Does your partner have problems with alcohol or drug use? No   Before you were pregnant did you have problems with alcohol or drug use? (past) No   In the past month, did you drink beer, wine, liquor or use any other drugs? (pregnancy) No   Maternal Screening call completed Yes   Call end time 1137              Kassie LEOS - Registered Nurse